# Patient Record
Sex: FEMALE | Race: WHITE | Employment: FULL TIME | URBAN - METROPOLITAN AREA
[De-identification: names, ages, dates, MRNs, and addresses within clinical notes are randomized per-mention and may not be internally consistent; named-entity substitution may affect disease eponyms.]

---

## 2018-12-12 ENCOUNTER — HOSPITAL ENCOUNTER (OUTPATIENT)
Dept: MAMMOGRAPHY | Age: 48
Discharge: HOME | End: 2018-12-12
Attending: NURSE PRACTITIONER
Payer: COMMERCIAL

## 2018-12-12 DIAGNOSIS — Z12.39 SCREENING BREAST EXAMINATION: ICD-10-CM

## 2018-12-12 PROCEDURE — 77067 SCR MAMMO BI INCL CAD: CPT

## 2018-12-26 ENCOUNTER — HOSPITAL ENCOUNTER (OUTPATIENT)
Dept: MAMMOGRAPHY | Age: 48
Discharge: HOME | End: 2018-12-26
Attending: NURSE PRACTITIONER
Payer: COMMERCIAL

## 2018-12-26 DIAGNOSIS — R92.8 ABNORMAL MAMMOGRAM: ICD-10-CM

## 2018-12-26 PROCEDURE — 77065 DX MAMMO INCL CAD UNI: CPT

## 2018-12-31 ENCOUNTER — HOSPITAL ENCOUNTER (OUTPATIENT)
Dept: MAMMOGRAPHY | Age: 48
Discharge: HOME | End: 2018-12-31
Attending: NURSE PRACTITIONER
Payer: COMMERCIAL

## 2018-12-31 DIAGNOSIS — R92.1 BREAST CALCIFICATIONS: ICD-10-CM

## 2018-12-31 PROCEDURE — 74011000250 HC RX REV CODE- 250: Performed by: RADIOLOGY

## 2018-12-31 PROCEDURE — 74011250636 HC RX REV CODE- 250/636: Performed by: RADIOLOGY

## 2018-12-31 PROCEDURE — 19081 BX BREAST 1ST LESION STRTCTC: CPT

## 2018-12-31 PROCEDURE — 77065 DX MAMMO INCL CAD UNI: CPT

## 2018-12-31 PROCEDURE — 88360 TUMOR IMMUNOHISTOCHEM/MANUAL: CPT

## 2018-12-31 PROCEDURE — 88305 TISSUE EXAM BY PATHOLOGIST: CPT

## 2018-12-31 PROCEDURE — 19082 BX BREAST ADD LESION STRTCTC: CPT

## 2018-12-31 RX ORDER — LIDOCAINE HYDROCHLORIDE AND EPINEPHRINE 10; 10 MG/ML; UG/ML
8 INJECTION, SOLUTION INFILTRATION; PERINEURAL ONCE
Status: COMPLETED | OUTPATIENT
Start: 2018-12-31 | End: 2018-12-31

## 2018-12-31 RX ORDER — LIDOCAINE HYDROCHLORIDE 10 MG/ML
12 INJECTION INFILTRATION; PERINEURAL
Status: COMPLETED | OUTPATIENT
Start: 2018-12-31 | End: 2018-12-31

## 2018-12-31 RX ORDER — SODIUM BICARBONATE 42 MG/ML
5 INJECTION, SOLUTION INTRAVENOUS
Status: COMPLETED | OUTPATIENT
Start: 2018-12-31 | End: 2018-12-31

## 2018-12-31 RX ADMIN — LIDOCAINE HYDROCHLORIDE AND EPINEPHRINE 80 MG: 10; 10 INJECTION, SOLUTION INFILTRATION; PERINEURAL at 10:20

## 2018-12-31 RX ADMIN — LIDOCAINE HYDROCHLORIDE 12 ML: 10 INJECTION, SOLUTION INFILTRATION; PERINEURAL at 10:20

## 2018-12-31 RX ADMIN — SODIUM BICARBONATE 210 MG: 42 SOLUTION INTRAVENOUS at 10:20

## 2019-01-09 PROBLEM — C50.912 BREAST CANCER, LEFT (HCC): Status: ACTIVE | Noted: 2019-01-09

## 2019-01-10 ENCOUNTER — OFFICE VISIT (OUTPATIENT)
Dept: SURGERY | Age: 49
End: 2019-01-10

## 2019-01-10 ENCOUNTER — TELEPHONE (OUTPATIENT)
Dept: SURGERY | Age: 49
End: 2019-01-10

## 2019-01-10 VITALS
HEART RATE: 88 BPM | WEIGHT: 249 LBS | HEIGHT: 72 IN | DIASTOLIC BLOOD PRESSURE: 94 MMHG | SYSTOLIC BLOOD PRESSURE: 151 MMHG | BODY MASS INDEX: 33.72 KG/M2

## 2019-01-10 DIAGNOSIS — Z80.3 FAMILY HISTORY OF BREAST CANCER: Primary | ICD-10-CM

## 2019-01-10 DIAGNOSIS — C50.412 MALIGNANT NEOPLASM OF UPPER-OUTER QUADRANT OF LEFT BREAST IN FEMALE, ESTROGEN RECEPTOR POSITIVE (HCC): ICD-10-CM

## 2019-01-10 DIAGNOSIS — Z17.0 MALIGNANT NEOPLASM OF UPPER-OUTER QUADRANT OF LEFT BREAST IN FEMALE, ESTROGEN RECEPTOR POSITIVE (HCC): Primary | ICD-10-CM

## 2019-01-10 DIAGNOSIS — C50.412 MALIGNANT NEOPLASM OF UPPER-OUTER QUADRANT OF LEFT BREAST IN FEMALE, ESTROGEN RECEPTOR POSITIVE (HCC): Primary | ICD-10-CM

## 2019-01-10 DIAGNOSIS — Z17.0 MALIGNANT NEOPLASM OF UPPER-OUTER QUADRANT OF LEFT BREAST IN FEMALE, ESTROGEN RECEPTOR POSITIVE (HCC): ICD-10-CM

## 2019-01-10 NOTE — PATIENT INSTRUCTIONS

## 2019-01-10 NOTE — PROGRESS NOTES
HISTORY OF PRESENT ILLNESS  Corin Rodriguez is a 50 y.o. female. HPI NEW patient referral for consultation by Sincere Alonso NP for an abnormal LEFT breast mammogram. Stereotactic biopsy X 2 revealed IDC, DCIS and ADH. Patient had not noticed any mass or skin change or nipple discharge before the biopsies. Now has some bruising LEFT breast.    Her sister is BRCA1 positive and had bilateral MAYELIN in 2003  FINAL PATHOLOGIC DIAGNOSIS   1. Breast, left, site #1, biopsy:   Invasive ductal carcinoma, favor histologic grade 2. Ductal carcinoma in situ. Microcalcifications associated with ductal carcinoma in situ. 2. Breast, left, site #2, biopsy:   Atypical ductal hyperplasia. Microcalcifications associated with benign duct profiles. \  1/2019 LEFT invasive ductal carcinoma, grade 2, %. [place] 70%, Her 2 -      OB History     Obstetric Comments    Menarche 15, LMP 1/1/2019, # of children 2, age of 4st delivery 32, Hysterectomy/oophorectomy no/no, Breast bx no, history of breast feeding yes, BCP yes, Hormone therapy no      Family History - Sister had breast cancer age 40. She is BRCA 1 positive as is her father. Her paternal grandmother had uterine cancer. Screening mammogram and LEFT diagnostic mammogram 12/31/2018  FINDINGS: Left digital diagnostic mammography was performed, and is interpreted  in conjunction with a computer assisted detection (CAD) system. Further  evaluation was performed for left breast calcifications. On the present exam  there are segmental fine pleomorphic calcifications in the left upper outer  quadrant centered at 1-2 o'clock anterior to middle depth. Calcifications span  up to 9 cm in anterior to posterior dimension, 9 cm in the transverse dimension,  and 11 cm craniocaudal.     IMPRESSION:     1. BI-RADS Assessment Category 4: Suspicious abnormality - Biopsy should be  performed in the absence of clinical contraindication.         Review of Systems   Constitutional: Negative. HENT: Negative. Eyes: Negative. Respiratory: Negative. Cardiovascular: Negative. Gastrointestinal: Negative. Genitourinary: Positive for urgency. Musculoskeletal: Negative. Skin: Negative. Neurological: Negative. Endo/Heme/Allergies: Negative. Psychiatric/Behavioral: The patient is nervous/anxious. Physical Exam   Constitutional: She is oriented to person, place, and time. She appears well-developed and well-nourished. Cardiovascular: Normal rate, regular rhythm and normal heart sounds. Pulmonary/Chest: Effort normal and breath sounds normal. Right breast exhibits no inverted nipple, no mass, no nipple discharge, no skin change and no tenderness. Left breast exhibits skin change (two biopsy scars and minimal ecchymosis UOQ). Left breast exhibits no inverted nipple, no mass, no nipple discharge and no tenderness. Breasts are symmetrical.       Lymphadenopathy:     She has no cervical adenopathy. She has no axillary adenopathy. Right: No supraclavicular adenopathy present. Left: No supraclavicular adenopathy present. Neurological: She is alert and oriented to person, place, and time. Skin: Skin is warm and dry. ASSESSMENT and PLAN    ICD-10-CM ICD-9-CM    1. Family history of breast cancer Z80.3 V16.3 BRAC-ANALYSIS   2. Malignant neoplasm of upper-outer quadrant of left breast in female, [facility] C50.412 174.4     Z17.0 V86.0        50 minutes were spent face-to-face with the patient and her sister during this encounter and >50% of that time was spent on counseling and coordination of care. 1. Discussed lumpectomy and radiation vs mastectomy. Discussed reconstruction. BRCA tested today. If she is BRCA positive she will have bilateral mastectomies. If she is BRCA negative, she may have a lumpectomy  2.  Discussed sentinel lymph node biopsy including intra-operative frozen section and possible lymph node dissection if the sentinel node is positive. 3. Discussed external beam radiation if she has a lumpectomy. 4. Discussed endocrine therapy which she should take after surgery. 5. It is unlikely that she will need chemotherapy. Plan: BRCA tested today  Plastic surgery appointment to discusse reconstruction options.

## 2019-01-11 ENCOUNTER — DOCUMENTATION ONLY (OUTPATIENT)
Dept: SURGERY | Age: 49
End: 2019-01-11

## 2019-01-17 NOTE — TELEPHONE ENCOUNTER
APPOINTMENT SCHEDULED FOR DR HUNT ON  1/25/19 AT 2:30 PM. PATIENT TO ARRIVE AT 2 PM  TO REGISTER  LOCATION IS [facility] #[id number]- [place]

## 2019-01-29 ENCOUNTER — TELEPHONE (OUTPATIENT)
Dept: SURGERY | Age: 49
End: 2019-01-29

## 2019-01-29 ENCOUNTER — DOCUMENTATION ONLY (OUTPATIENT)
Dept: SURGERY | Age: 49
End: 2019-01-29

## 2019-01-29 DIAGNOSIS — Z17.0 MALIGNANT NEOPLASM OF UPPER-OUTER QUADRANT OF LEFT BREAST IN FEMALE, ESTROGEN RECEPTOR POSITIVE (HCC): Primary | ICD-10-CM

## 2019-01-29 DIAGNOSIS — C50.412 MALIGNANT NEOPLASM OF UPPER-OUTER QUADRANT OF LEFT BREAST IN FEMALE, ESTROGEN RECEPTOR POSITIVE (HCC): Primary | ICD-10-CM

## 2019-01-29 NOTE — TELEPHONE ENCOUNTER
Called patient. She is BRCA positive  She will have bilateral mastectomies  Dr Joey Reed is not available for surgery until May.   Will do a lumpectomy and node biopsy first

## 2019-01-29 NOTE — PROGRESS NOTES
PATIENT SCHEDULED TO SEE DR. DONNELLY ON 2-27-19 AT 4:15 P.M.; PATIENT IS TO ARRIVE NO LATER THAN 3:45 PM FOR APPOINTMENT. PACKET WILL BE SENT TO PATIENT AND SHE WILL FILL OUT PAPERWORK AND BRING TO APPOINTMENT.     PATIENT WAS CALLED AND GIVEN INSTRUCTIONS

## 2019-01-29 NOTE — TELEPHONE ENCOUNTER
SCHEDULED SURGERY AT [facility] ON 2-20-19 AT 7:30 AM; PATIENT WILL ARRIVE AT 6:00 AM AND CHECK IN ON 2ND FL AT PATIENT REGISTRATION    POST OP AT [facility] ON 2-28-19 AT 1:30 PM    PATIENT HAS BEEN CALLED AND GIVEN INSTRUCTIONS

## 2019-02-01 DIAGNOSIS — Z17.0 MALIGNANT NEOPLASM OF UPPER-OUTER QUADRANT OF LEFT BREAST IN FEMALE, ESTROGEN RECEPTOR POSITIVE (HCC): Primary | ICD-10-CM

## 2019-02-01 DIAGNOSIS — C50.412 MALIGNANT NEOPLASM OF UPPER-OUTER QUADRANT OF LEFT BREAST IN FEMALE, ESTROGEN RECEPTOR POSITIVE (HCC): Primary | ICD-10-CM

## 2019-02-01 NOTE — TELEPHONE ENCOUNTER
NEEDLE LOCALIZATION WAS ADDED TO SURGERY ON 2-20-19 AT 8:00 A. M. WITH SURGERY STARTING AT 10:30 A. M.; PATIENT WILL STILL ARRIVE AT 6:00 AM TO CHECK IN ON THE 1ST FLOOR. LEFT DETAILED MESSAGE ON PATIENT'S CELL PHONE TO CHECK IN ON THE 1ST FLOOR NOW INSTEAD OF 2ND FL BUT TO STILL ARRIVE AT 6 AM.  WILL CALL PATIENT AGAIN.

## 2019-02-05 ENCOUNTER — DOCUMENTATION ONLY (OUTPATIENT)
Dept: SURGERY | Age: 49
End: 2019-02-05

## 2019-02-05 NOTE — PROGRESS NOTES
PATIENT CONFIRMED TODAY THAT SHE WILL ARRIVE TO [facility] ON 2-20-19 AT 6 AM CHECKING IN ON 1ST FLOOR REGISTRATION

## 2019-02-06 RX ORDER — BISMUTH SUBSALICYLATE 262 MG
1 TABLET,CHEWABLE ORAL DAILY
COMMUNITY
End: 2019-06-10

## 2019-02-06 RX ORDER — ERGOCALCIFEROL 1.25 MG/1
50000 CAPSULE ORAL DAILY
COMMUNITY
End: 2019-03-04

## 2019-02-06 NOTE — PERIOP NOTES
[address] [address] [facility]                            MAIN OR                                  [phone]   MAIN PRE OP                          [phone]                                                                                AMBULATORY PRE OP          [phone]  PRE-ADMISSION TESTING    [phone]     Surgery Date:   2/20/19         Is surgery arrival time given by surgeon? YES  If NO, [facility] staff will call you between 3 and 7pm the day before your surgery with your arrival time. (If your surgery is on a Monday, we will call you the Friday before.)    Call [phone] after 7pm Monday-Friday if you did not receive your arrival time. INSTRUCTIONS BEFORE YOUR SURGERY   When You  Arrive   Arrive at the 2nd [address] [facility] on the day of your surgery  Have your insurance card, photo ID, and any copayment (if needed)     Food   and   Drink   NO food or drink after midnight the night before surgery    This means NO water, gum, mints, coffee, juice, etc.  No alcohol (beer, wine, liquor) 24 hours before and after surgery     Medications to   TAKE   Morning of Surgery   MEDICATIONS TO TAKE THE MORNING OF SURGERY WITH A SIP OF WATER:    NONE     Medications  To  STOP      7 days before surgery    Non-Steroidal anti-inflammatory Drugs (NSAID's): for example, Ibuprofen (Advil, Motrin), Naproxen (Aleve)   Aspirin, if taking for pain    Herbal supplements, vitamins, and fish oil   Other:  (Pain medications not listed above, including Tylenol may be taken)   Blood  Thinners    If you take  Aspirin, Plavix, Coumadin, or any blood-thinning or anti-blood clot medicine, talk to the doctor who prescribed the medications for pre-operative instructions.      Bathing Clothing  Jewelry  Valuables       If you shower the morning of surgery, please do not apply anything to your skin (lotions, powders, deodorant, or makeup, especially joann)   Follow all special bath instructions (for total joint replacement, spine and bowel surgeries)   Do not shave or trim anywhere 24 hours before surgery   Wear your hair loose or down; no pony-tails, buns, or metal hair clips   Wear loose, comfortable, clean clothes   Wear glasses instead of contacts   Leave money, valuables, and jewelry, including body piercings, at home     Going Home       or Spending the Night    SAME-DAY SURGERY: You must have a responsible adult drive you home and stay with you 24 hours after surgery   ADMITS: If your doctor is keeping you into the hospital after surgery, leave personal belongings/luggage in your car until you have a hospital room number. Hospital discharge time is 12 noon  Drivers must be here before 12 noon unless you are told differently   Special Instructions          Follow all instructions so your surgery wont be cancelled. Please, be on time. If a situation occurs and you are delayed the day of surgery, call [phone] or          [phone]. If your physical condition changes (like a fever, cold, flu, etc.) call your surgeon. The patient was contacted  via phone. Home medication reviewed and verified during PAT appointment. The patient verbalizes understanding of all instructions and does not  need reinforcement.

## 2019-02-19 ENCOUNTER — ANESTHESIA EVENT (OUTPATIENT)
Dept: SURGERY | Age: 49
End: 2019-02-19
Payer: COMMERCIAL

## 2019-02-19 ENCOUNTER — TELEPHONE (OUTPATIENT)
Dept: SURGERY | Age: 49
End: 2019-02-19

## 2019-02-19 NOTE — PERIOP NOTES
Received phone call from panda Owens requesting clarification of her time for needle localization and actual OR time    Per chart:  pt was told to be at 1st floor registration for her mammography appointment (needle loc & wire placement by 7:15 on 2/20/19 for her first procedure and that mammography staff  would  Transport her up to surgical suite immediately after the first procedure.   Her actual OR time is listed as 10:30

## 2019-02-19 NOTE — TELEPHONE ENCOUNTER
Received message from Segundo Moseley at [facility], inquiring about how many sites would have to be needle loc prior to surgery on 2/20/19. Spoke with Dr. Viktoriya Ayers and she stated patient would need bracketing wires for needle loc. I called back and spoke with Lisbeth to relay this information.

## 2019-02-20 ENCOUNTER — APPOINTMENT (OUTPATIENT)
Dept: MAMMOGRAPHY | Age: 49
End: 2019-02-20
Attending: SURGERY
Payer: COMMERCIAL

## 2019-02-20 ENCOUNTER — HOSPITAL ENCOUNTER (OUTPATIENT)
Age: 49
Setting detail: OUTPATIENT SURGERY
Discharge: HOME | End: 2019-02-20
Attending: SURGERY | Admitting: SURGERY
Payer: COMMERCIAL

## 2019-02-20 ENCOUNTER — HOSPITAL ENCOUNTER (OUTPATIENT)
Dept: GENERAL RADIOLOGY | Age: 49
Setting detail: OUTPATIENT SURGERY
Discharge: HOME | End: 2019-02-20
Attending: SURGERY | Admitting: SURGERY
Payer: COMMERCIAL

## 2019-02-20 ENCOUNTER — ANESTHESIA (OUTPATIENT)
Dept: SURGERY | Age: 49
End: 2019-02-20
Payer: COMMERCIAL

## 2019-02-20 VITALS
TEMPERATURE: 98 F | RESPIRATION RATE: 15 BRPM | DIASTOLIC BLOOD PRESSURE: 88 MMHG | WEIGHT: 221 LBS | BODY MASS INDEX: 29.93 KG/M2 | HEIGHT: 72 IN | SYSTOLIC BLOOD PRESSURE: 142 MMHG | HEART RATE: 84 BPM | OXYGEN SATURATION: 99 %

## 2019-02-20 DIAGNOSIS — Z85.3 HX OF BREAST CANCER: ICD-10-CM

## 2019-02-20 DIAGNOSIS — Z17.0 MALIGNANT NEOPLASM OF UPPER-OUTER QUADRANT OF LEFT BREAST IN FEMALE, ESTROGEN RECEPTOR POSITIVE (HCC): ICD-10-CM

## 2019-02-20 DIAGNOSIS — Z98.890 HISTORY OF BREAST SURGERY: Primary | ICD-10-CM

## 2019-02-20 DIAGNOSIS — C50.412 MALIGNANT NEOPLASM OF UPPER-OUTER QUADRANT OF LEFT FEMALE BREAST (HCC): ICD-10-CM

## 2019-02-20 DIAGNOSIS — C50.412 MALIGNANT NEOPLASM OF UPPER-OUTER QUADRANT OF LEFT BREAST IN FEMALE, ESTROGEN RECEPTOR POSITIVE (HCC): ICD-10-CM

## 2019-02-20 LAB — HCG UR QL: NEGATIVE

## 2019-02-20 PROCEDURE — 77030020782 HC GWN BAIR PAWS FLX 3M -B

## 2019-02-20 PROCEDURE — 77030002996 HC SUT SLK J&J -A: Performed by: SURGERY

## 2019-02-20 PROCEDURE — 74011250636 HC RX REV CODE- 250/636

## 2019-02-20 PROCEDURE — 77030031139 HC SUT VCRL2 J&J -A: Performed by: SURGERY

## 2019-02-20 PROCEDURE — 81025 URINE PREGNANCY TEST: CPT

## 2019-02-20 PROCEDURE — 88342 IMHCHEM/IMCYTCHM 1ST ANTB: CPT

## 2019-02-20 PROCEDURE — 77030002933 HC SUT MCRYL J&J -A: Performed by: SURGERY

## 2019-02-20 PROCEDURE — 19282 PERQ DEVICE BREAST EA IMAG: CPT

## 2019-02-20 PROCEDURE — 19281 PERQ DEVICE BREAST 1ST IMAG: CPT

## 2019-02-20 PROCEDURE — 77030018836 HC SOL IRR NACL ICUM -A: Performed by: SURGERY

## 2019-02-20 PROCEDURE — 77030039266 HC ADH SKN EXOFIN S2SG -A: Performed by: SURGERY

## 2019-02-20 PROCEDURE — 74011250636 HC RX REV CODE- 250/636: Performed by: SURGERY

## 2019-02-20 PROCEDURE — 77030020143 HC AIRWY LARYN INTUB CGAS -A: Performed by: NURSE ANESTHETIST, CERTIFIED REGISTERED

## 2019-02-20 PROCEDURE — 76210000050 HC AMBSU PH II REC 0.5 TO 1 HR: Performed by: SURGERY

## 2019-02-20 PROCEDURE — 74011000250 HC RX REV CODE- 250: Performed by: SURGERY

## 2019-02-20 PROCEDURE — 88341 IMHCHEM/IMCYTCHM EA ADD ANTB: CPT

## 2019-02-20 PROCEDURE — 77030032490 HC SLV COMPR SCD KNE COVD -B: Performed by: SURGERY

## 2019-02-20 PROCEDURE — 76030000001 HC AMB SURG OR TIME 1 TO 1.5: Performed by: SURGERY

## 2019-02-20 PROCEDURE — 76060000062 HC AMB SURG ANES 1 TO 1.5 HR: Performed by: SURGERY

## 2019-02-20 PROCEDURE — 74011250636 HC RX REV CODE- 250/636: Performed by: ANESTHESIOLOGY

## 2019-02-20 PROCEDURE — 88307 TISSUE EXAM BY PATHOLOGIST: CPT

## 2019-02-20 PROCEDURE — 76210000040 HC AMBSU PH I REC FIRST 0.5 HR: Performed by: SURGERY

## 2019-02-20 RX ORDER — BUPIVACAINE HYDROCHLORIDE AND EPINEPHRINE 5; 5 MG/ML; UG/ML
30 INJECTION, SOLUTION EPIDURAL; INTRACAUDAL; PERINEURAL
Status: COMPLETED | OUTPATIENT
Start: 2019-02-20 | End: 2019-02-20

## 2019-02-20 RX ORDER — LIDOCAINE HYDROCHLORIDE 10 MG/ML
4 INJECTION, SOLUTION EPIDURAL; INFILTRATION; INTRACAUDAL; PERINEURAL
Status: COMPLETED | OUTPATIENT
Start: 2019-02-20 | End: 2019-02-20

## 2019-02-20 RX ORDER — LIDOCAINE HYDROCHLORIDE 10 MG/ML
0.1 INJECTION, SOLUTION EPIDURAL; INFILTRATION; INTRACAUDAL; PERINEURAL AS NEEDED
Status: DISCONTINUED | OUTPATIENT
Start: 2019-02-20 | End: 2019-02-20 | Stop reason: HOSPADM

## 2019-02-20 RX ORDER — HYDROMORPHONE HYDROCHLORIDE 1 MG/ML
.25-1 INJECTION, SOLUTION INTRAMUSCULAR; INTRAVENOUS; SUBCUTANEOUS
Status: DISCONTINUED | OUTPATIENT
Start: 2019-02-20 | End: 2019-02-20 | Stop reason: HOSPADM

## 2019-02-20 RX ORDER — FENTANYL CITRATE 50 UG/ML
INJECTION, SOLUTION INTRAMUSCULAR; INTRAVENOUS AS NEEDED
Status: DISCONTINUED | OUTPATIENT
Start: 2019-02-20 | End: 2019-02-20 | Stop reason: HOSPADM

## 2019-02-20 RX ORDER — MIDAZOLAM HYDROCHLORIDE 1 MG/ML
INJECTION, SOLUTION INTRAMUSCULAR; INTRAVENOUS AS NEEDED
Status: DISCONTINUED | OUTPATIENT
Start: 2019-02-20 | End: 2019-02-20 | Stop reason: HOSPADM

## 2019-02-20 RX ORDER — METHYLENE BLUE 10 MG/ML
2.5 INJECTION INTRAVENOUS ONCE
Status: COMPLETED | OUTPATIENT
Start: 2019-02-20 | End: 2019-02-20

## 2019-02-20 RX ORDER — FLUMAZENIL 0.1 MG/ML
0.2 INJECTION INTRAVENOUS
Status: DISCONTINUED | OUTPATIENT
Start: 2019-02-20 | End: 2019-02-20 | Stop reason: HOSPADM

## 2019-02-20 RX ORDER — LIDOCAINE HYDROCHLORIDE 10 MG/ML
INJECTION, SOLUTION EPIDURAL; INFILTRATION; INTRACAUDAL; PERINEURAL
Status: COMPLETED
Start: 2019-02-20 | End: 2019-02-20

## 2019-02-20 RX ORDER — DEXAMETHASONE SODIUM PHOSPHATE 4 MG/ML
INJECTION, SOLUTION INTRA-ARTICULAR; INTRALESIONAL; INTRAMUSCULAR; INTRAVENOUS; SOFT TISSUE AS NEEDED
Status: DISCONTINUED | OUTPATIENT
Start: 2019-02-20 | End: 2019-02-20 | Stop reason: HOSPADM

## 2019-02-20 RX ORDER — NALOXONE HYDROCHLORIDE 0.4 MG/ML
0.2 INJECTION, SOLUTION INTRAMUSCULAR; INTRAVENOUS; SUBCUTANEOUS
Status: DISCONTINUED | OUTPATIENT
Start: 2019-02-20 | End: 2019-02-20 | Stop reason: HOSPADM

## 2019-02-20 RX ORDER — MIDAZOLAM HYDROCHLORIDE 1 MG/ML
2 INJECTION, SOLUTION INTRAMUSCULAR; INTRAVENOUS
Status: DISCONTINUED | OUTPATIENT
Start: 2019-02-20 | End: 2019-02-20 | Stop reason: HOSPADM

## 2019-02-20 RX ORDER — DIPHENHYDRAMINE HYDROCHLORIDE 50 MG/ML
12.5 INJECTION, SOLUTION INTRAMUSCULAR; INTRAVENOUS AS NEEDED
Status: DISCONTINUED | OUTPATIENT
Start: 2019-02-20 | End: 2019-02-20 | Stop reason: HOSPADM

## 2019-02-20 RX ORDER — HYDROCODONE BITARTRATE AND ACETAMINOPHEN 5; 325 MG/1; MG/1
1 TABLET ORAL
Qty: 10 TAB | Refills: 0 | Status: SHIPPED | OUTPATIENT
Start: 2019-02-20 | End: 2019-03-04

## 2019-02-20 RX ORDER — ONDANSETRON 2 MG/ML
INJECTION INTRAMUSCULAR; INTRAVENOUS AS NEEDED
Status: DISCONTINUED | OUTPATIENT
Start: 2019-02-20 | End: 2019-02-20 | Stop reason: HOSPADM

## 2019-02-20 RX ORDER — SODIUM CHLORIDE, SODIUM LACTATE, POTASSIUM CHLORIDE, CALCIUM CHLORIDE 600; 310; 30; 20 MG/100ML; MG/100ML; MG/100ML; MG/100ML
125 INJECTION, SOLUTION INTRAVENOUS CONTINUOUS
Status: DISCONTINUED | OUTPATIENT
Start: 2019-02-20 | End: 2019-02-20 | Stop reason: HOSPADM

## 2019-02-20 RX ORDER — PROPOFOL 10 MG/ML
INJECTION, EMULSION INTRAVENOUS AS NEEDED
Status: DISCONTINUED | OUTPATIENT
Start: 2019-02-20 | End: 2019-02-20 | Stop reason: HOSPADM

## 2019-02-20 RX ADMIN — FENTANYL CITRATE 25 MCG: 50 INJECTION, SOLUTION INTRAMUSCULAR; INTRAVENOUS at 10:56

## 2019-02-20 RX ADMIN — PROPOFOL 200 MG: 10 INJECTION, EMULSION INTRAVENOUS at 10:54

## 2019-02-20 RX ADMIN — HYDROMORPHONE HYDROCHLORIDE 0.5 MG: 1 INJECTION, SOLUTION INTRAMUSCULAR; INTRAVENOUS; SUBCUTANEOUS at 12:35

## 2019-02-20 RX ADMIN — FENTANYL CITRATE 25 MCG: 50 INJECTION, SOLUTION INTRAMUSCULAR; INTRAVENOUS at 12:01

## 2019-02-20 RX ADMIN — MIDAZOLAM HYDROCHLORIDE 2 MG: 1 INJECTION, SOLUTION INTRAMUSCULAR; INTRAVENOUS at 10:47

## 2019-02-20 RX ADMIN — FENTANYL CITRATE 25 MCG: 50 INJECTION, SOLUTION INTRAMUSCULAR; INTRAVENOUS at 11:06

## 2019-02-20 RX ADMIN — FENTANYL CITRATE 25 MCG: 50 INJECTION, SOLUTION INTRAMUSCULAR; INTRAVENOUS at 10:57

## 2019-02-20 RX ADMIN — DEXAMETHASONE SODIUM PHOSPHATE 8 MG: 4 INJECTION, SOLUTION INTRA-ARTICULAR; INTRALESIONAL; INTRAMUSCULAR; INTRAVENOUS; SOFT TISSUE at 11:04

## 2019-02-20 RX ADMIN — SODIUM CHLORIDE, SODIUM LACTATE, POTASSIUM CHLORIDE, AND CALCIUM CHLORIDE 125 ML/HR: 600; 310; 30; 20 INJECTION, SOLUTION INTRAVENOUS at 10:15

## 2019-02-20 RX ADMIN — FENTANYL CITRATE 25 MCG: 50 INJECTION, SOLUTION INTRAMUSCULAR; INTRAVENOUS at 10:47

## 2019-02-20 RX ADMIN — LIDOCAINE HYDROCHLORIDE 3 ML: 10 INJECTION, SOLUTION EPIDURAL; INFILTRATION; INTRACAUDAL; PERINEURAL at 08:25

## 2019-02-20 RX ADMIN — FENTANYL CITRATE 25 MCG: 50 INJECTION, SOLUTION INTRAMUSCULAR; INTRAVENOUS at 11:32

## 2019-02-20 RX ADMIN — ONDANSETRON 4 MG: 2 INJECTION INTRAMUSCULAR; INTRAVENOUS at 12:08

## 2019-02-20 RX ADMIN — HYDROMORPHONE HYDROCHLORIDE 0.5 MG: 1 INJECTION, SOLUTION INTRAMUSCULAR; INTRAVENOUS; SUBCUTANEOUS at 12:25

## 2019-02-20 RX ADMIN — FENTANYL CITRATE 25 MCG: 50 INJECTION, SOLUTION INTRAMUSCULAR; INTRAVENOUS at 10:58

## 2019-02-20 RX ADMIN — FENTANYL CITRATE 25 MCG: 50 INJECTION, SOLUTION INTRAMUSCULAR; INTRAVENOUS at 11:18

## 2019-02-20 NOTE — OP NOTES
Re[facility] [address]  [address] [facility] [address]    Name: Suzy Kan  : 1970    Left Breast Lumpectomy with [place] Node Biopsy   Operative Report      Date of Surgery: 2019  Preoperative Diagnosis:  Left breast cancer, UOQ  Postoperative Diagnosis: same   Surgeon: Dr Jack Dang  Anesthesia: General  Procedure: Left breast lumpectomy with sentinel lymph node biopsy  Indication: LEFT breast cancer           Procedure Note:  Suzy Kan was brought into the operating room and placed supine on the table. She was induced with general anesthesia. 5cc of 1/2 strength methylene blue dye was injected in the LEFT subareolar space and the breast was massage for 5 minutes. The LEFT breast and axilla were then prepped and draped in the usual sterile fashion. 0.5% marcaine was used for local anesthesia. A 3cm incision was made in the lower axilla and the axilla was accessed with blunt dissection. 1 blue and 2 non-blue lymph nodes were removed and sent to pathology. No other enlarged lymph nodes were palpated. The breast tumor was in the [address] [place] [address] Road. Three guide wires were placed preoperatively to wilfrid 2 biopsy clips and clustered microcalcifications. A 6 cm oblique incision was made in the UOQ 3 cm from the nipple, between the wires. Wide excision of the tissue and wires was performed using sharp dissection and electrocautery. The specimen was marked with sutures for orientation purposes, x-rayed to confirm that the clips and wires had been removed, and sent to pathology. Hemostasis was controlled with electrocautery. Both incisions were closed in 2 layers with 3-0 vicryl for the subcutaneous tissue and 4-0 Monocryl for the skin. The wounds were dressed with skin glue and the patient was awakened and extubated and taken to the recovery room. Sponge, needle and instrument counts were reported be correct.      Findings:  3 sentinel nodes (1 blue)  Estimated Blood Loss:  20 cc       Specimens:   ID Type Source Tests Collected by Time Destination   1 : left axillary sentinel nodes (3) Preservative Axilla  Miranda Castro MD 2/20/2019 1120 Pathology   2 : left breast lumpectomy; 2 clips, 3 wires Preservative Breast  Miranda Castro MD 2/20/2019 1143 Pathology      Complications: none  Implants: none  Assistant: Emanuel Grijalva SA    Signed:  Kellie Busch MD

## 2019-02-20 NOTE — PERIOP NOTES
PACU IN REPORT FROM ANESTHESIA    Verbal report received from   [facility]   following General anesthesia  for Procedure(s) (LRB):  LEFT BREAST LUMPECTOMY WITH NEEDLE LOCALIZATION, LEFT BREAST SENTINEL NODE BIOPSY WITH BLUE DYE (Left) by  Janette Gonzalez MD.    Note the anesthesia record for medications given intraoperatively. Brief Initial Visual Assessment:    Patient Age: [] Infant(1-12mo)      []Pediatric(1-13yrs)    [] Adolescent(13-18yrs)    [x] Adult(18-65yrs)      []Geriatric Adult(>65yrs). Patient    [] Alert           [x]Calm & Cooperative      [] Anxious  Appearance: [x] Drowsy      [] Sedated                          [] Unresponsive     Oriented x   3             Airway:     [x] Patent                          [] Near-obstructed   [] Obstructed                        []Improved with head/airway repositioning                       Airway Adjuncts Present: [] Oral Airway    [] Nasal Trumpet         [] ETT     Respiratory  [x] Even              [] Labored      [] Shallow  Pattern:    [x] Non-Labored  [] VENT and/or respiratory assistance     being provided. Skin:     [x] Pink [] Dusky    [] Pale        [x] Warm    [] Hot [] Cool        [] Cold   [x]Dry [] Moist [] Diaphoretic     Membranes:  [x] Pink [] Pale       [x] Moist [] Dry [] Crusty     Pain:   [x] No Acute Discomfort. 3  /10 Scale [x] Verbal Numeric   [] Moderate Discomfort.      [] V.A.S. [] Acute Discomfort.                [] A.N.V.    [] Chronic-Issue Related Discomfort.   [] F.L.A.C.C. Note E-MAR for medications administered. []Faces, Tucker/Baker    Note assessments documented in flowsheets; any assessment variants to be found in comments or narrative perioperative nurse notes.        Post-anesthesia care now assumed by Elo MENDOSAN, RN-BC

## 2019-02-20 NOTE — PROGRESS NOTES
POST ANESTHESIA CARE    DISCHARGE / TRANSFER NOTE    Corin Rodriguez was   discharged     via      wheel chair     to  private vehicle    . Patient was escorted by    nurse   . Patient verbalized   appreciation and was very pleased with care received   throughout their stay. Patient was discharged in     pleasant mood  . Pain at discharge/transfer was       3-4   /10. Discharge, medication and follow-up instructions were verbalized as understood prior to discharge  (if applicable for same-day procedures being discharged.)    All personal belongings have been returned to patient, and patient/family verbally confirm receiving belongings as all present.     Signed: Xavier MENDOSAN RN-BC

## 2019-02-20 NOTE — H&P
History and Physical    Surgery History and Physical          Funmilayo Ibarra is a 50 y.o. female who presents for LEFT lumpectomy and sentinel node biopsy. 1/2019 LEFT invasive ductal carcinoma, grade 2, %. [place] 70%, Her 2 -  BRCA 1 positive    Review of Systems   Constitutional: Negative. HENT: Negative. Eyes: Negative. Respiratory: Negative. Cardiovascular: Negative. Gastrointestinal: Negative. Genitourinary: Positive for urgency. Musculoskeletal: Negative. Skin: Negative. Neurological: Negative. Endo/Heme/Allergies: Negative. Psychiatric/Behavioral: The patient is nervous/anxious.          Physical Exam   Constitutional: She is oriented to person, place, and time. She appears well-developed and well-nourished. Cardiovascular: Normal rate, regular rhythm and normal heart sounds. Pulmonary/Chest: Effort normal and breath sounds normal. Right breast exhibits no inverted nipple, no mass, no nipple discharge, no skin change and no tenderness. Left breast exhibits skin change (two biopsy scars and minimal ecchymosis UOQ). Left breast exhibits no inverted nipple, no mass, no nipple discharge and no tenderness. Breasts are symmetrical.       Lymphadenopathy:     She has no cervical adenopathy. She has no axillary adenopathy. Right: No supraclavicular adenopathy present. Left: No supraclavicular adenopathy present. Neurological: She is alert and oriented to person, place, and time. Skin: Skin is warm and dry.      Past Medical History:   Diagnosis Date    Rosacea      Past Surgical History:   Procedure Laterality Date    HX WISDOM TEETH EXTRACTION  1990      Family History   Problem Relation Age of Onset    Breast Cancer Sister         mid 35s     Social History     Tobacco Use    Smoking status: Never Smoker    Smokeless tobacco: Never Used   Substance Use Topics    Alcohol use: Yes     Drinks per session: 1 or 2      Prior to Admission medications Medication Sig Start Date End Date Taking? Authorizing Provider   multivitamin (ONE A DAY) tablet Take 1 Tab by mouth daily. Yes Provider, Historical   ergocalciferol (VITAMIN D2) 50,000 unit capsule Take 50,000 Units by mouth daily. Yes Provider, Historical   multivit-min/folic acid/biotin (HAIR,SKIN AND NAILS,FA-BIOTIN, PO) Take 1 Tab by mouth daily. Yes Provider, Historical   DOXYCYCLINE HYCLATE PO Take 10 mg by mouth daily. Provider, Historical      Allergies   Allergen Reactions    Penicillins Hives     Pt reports \"I reacted as a child with hives and was told never to take it again\"         IMPRESSION:LEFT breast cancer  PLAN: LEFT lumpectomy, needle localized, and sentinel node biopsy  Bilateral mastectomy and MAYELIN in a few months.   Signed By: Sharon Montano MD     February 20, 2019

## 2019-02-20 NOTE — DISCHARGE INSTRUCTIONS
Discharge Instructions from Dr. Ramsey Samples    Patient Discharge Instructions    Funmilayo Ibarra / [id number] : 1970    Admitted 2019 Discharged: 2019   What to do at Home  Diet:Regular  Activity: As tolerated. No driving if taking pain medication. Okay to shower or take a bath. You may chose to wear a bra to sleep in for extra support for the next few days. Pain control: Ice pack 20 minutes of every hour until you go to bed tonight. You may use over-the-counter medication as needed (acetominophen, aspirin, ibuprofen). Fill the prescription for hydrocodone if needed. Tomorrow you may put a heat pack on the breast.  Dressing: The skin glue is waterproof. It is okay to wash normally at this site. If the glue is still present after 10 days you should peel it off. Follow up: , 1:30pm [facility] at [facility]. Problems/Questions: Call Elva Mosquera MD on my cell phone. [phone]            DISCHARGE SUMMARY from Your Nurse    PATIENT INSTRUCTIONS:    AFTER ANESTHESIA & SEDATION, and WHILE TAKING PAIN MEDICINE  After general anesthesia / intravenous sedation and the 24 hours following, and/or while taking prescription Opiates:  · Limit your activities  · Do not drive and operate hazardous machinery until you have been of all narcotics and sedatives for over 24 hours  · Do not make important personal or business decisions  · Do  not drink alcoholic beverages  · If you have not urinated within 8 hours after discharge, please contact your surgeon on call.       SIGNS OF INFECTION  Report the following Signs of Infection or General Problems after surgery to your surgeon:  · Excessive pain, swelling, redness or odor of or around the surgical area  · Temperature over 101; Temperature over 100 if on medications that affect your ability to fight infections  · Nausea and vomiting lasting longer than 4 hours or if unable to take medications  · Any signs of decreased circulation or nerve impairment to extremity: change in color, persistent  numbness, tingling, coldness or increase pain  · If you have any questions. COUGH AND DEEP BREATHE  Breathing deep and coughing are very important exercises to do after surgery. Deep breathing and coughing open the little air tubes and air sacks in your lungs. You take deep breaths every day. You may not even notice - it is just something you do when you sigh or yawn. It is a natural exercise you do to keep these air passages open. After surgery, take deep breaths and cough, on purpose. Coughing and deep breathing help prevent bronchitis and pneumonia after surgery. If you had chest or belly surgery, use a pillow as a \"hug elian\" and hold it tightly to your chest or belly when you cough. DIRECTIONS:  1. Take 10 to 15 slow deep breaths every hour while awake. 2. Breathe in deeply, and hold it for 2 seconds. 3. Exhale slowly through puckered lips, like blowing up a balloon. 4. After every 4th or 5th deep breath, hug your pillow to your chest or belly and give a hard, deep cough. Yes, it will probably hurt. But doing this exercise is very important part of healing after surgery. Take your pain medicine to help you do this exercise without too much pain. ANKLE PUMPS    Ankle pumps increase the circulation of oxygenated blood to your lower extremities and decrease your risk for circulation problems such as blood clots. They also stretch the muscles, tendons and ligaments in your foot and ankle, and prevent joint contracture in the ankle and foot, especially after surgeries on the legs. It is important to do ankle pump exercises regularly after surgery because immobility increases your risk for developing a blood clot. Your doctor may also have you take an Aspirin for the next few days as well.       If your doctor did not ask you to take an Aspirin, consult with him before starting Aspirin therapy on your own. The exercise is quite simple. · Slowly point your foot forward, feeling the muscles on the top of your lower leg stretch, and hold this position for 5 seconds. · Next, pull your foot back toward you as far as possible, stretching the calf muscles, and hold that position for 5 seconds. · Repeat with the other foot. · Perform 10 repetitions every hour while awake for both ankles if possible (down and then up with the foot once is one repetition). You should feel gentle stretching of the muscles in your lower leg when doing this exercise. If you feel pain, or your range of motion is limited, don't push too hard. Only go the limit your joint and muscles will let you go. If you have increasing pain, progressively worsening leg warmth or swelling, STOP the exercise and call your doctor. Other Wound Care information:  [x] No additional recommendations. Below is information on the medication(s) your doctor is prescribing for you: The maximum daily dose of acetaminophen was discussed with the patient. She was encouraged not to exceed 3,000 mg of acetaminophen during a 24 hour period and was asked to keep in mind that acetaminophen can also be found in many over-the-counter cold medications as well as narcotics that may be given for pain. The patient expresses understanding of these issues and questions were answered. 4 THINGS ABOUT PAIN MEDICINE I ALWAYS TALK ABOUT:  There are 4 side effects I always talk about for pain medications. 1. They make you sleepy and drowsy. Do not drive a car or operate machines while taking pain medication. Do not make any major decisions. Take a nap. Relax. Let your body recover from the affects of anesthesia and surgery. 2. Some people have quite a problem with itching and. ..  3. Nausea or vomiting.   I mention these together because research tends to suggest there is a gene-related issue. While some have a hard time with these problems, others do not. These are expected and know side effects. Over-the-counter Benadryl® (the drug name is diphenhydramine) can help with the itching. Your doctor may also have given you some medicine for nausea. IF HE DID NOT, CALL HIM/HER. 4. Last but not least is the problem of constipation (not camelia able to have a bowel movement - poop.)  All pain medicine can slow down the movement of food through the gut. The slower it goes, the worse it can be. Frankly, this means hard poop adding insult to the injury of surgery. And if you had tummy surgery, like having your gall bladder removed or a hernia repair, YOU DO NOT WANT THIS PROBLEM. There are 4 things I recommend. · Drink lots of fluids. For healthy people with no heart problems, this means at least 64 ounces of liquids or more per day. For example, a Big Gulp® from [employer] is 32 ounces. So you need to drink at least 2  Big Gulp®'s of fluids every day. If you have heart problems you may not be able to do this. Talk to your doctor about what you should do to prevent constipation. · Drinking fruit juice like apple, pear, or prune juice gives you extra \"BANG\" for your beverage. These drinks are high in natural fiber. If you are a diabetic, drink sugar-free fluids with fiber additives (see next 2 points.)  Avoid drinking extra fruit juice unless this is a regular part of your diet plan. · Eat extra fresh fruits and vegetables. · Add extra fiber-products. Fiber products like Metamucil®, Citrucel®, Miralax® or Benefiber® can help. These products are over-the-counter and you do not need a prescription from your doctor. If you have followed these recommendations and still have some difficulty having a good poop, take and over-the-counter stimulant like Dulcolax® (biscodyl)  or Senokot® (senna concentrate). These may help get things moving.       [employer] MEDICATION AND SIDE EFFECT GUIDE    The [facility] MEDICATION AND SIDE EFFECT GUIDE was provided to the PATIENT AND CARE PROVIDER. Information provided includes instruction about drug purpose and common side effects for the following medications:   · [address] - for pain        Medication information added to discharge record on February 20, 2019 at 12:38 PM.      Some information we wish all of our patients to be familiar with and General Information for Healthy Lifestyle choices:    · Make a list of your current medications with your Primary Care Provider. · Update this list whenever your medications are discontinued, doses are changed, or new medications (including over-the-counter products like ibuprofen, vitamins, or herbal remedies) are added. · Carry medication information at all times in case of emergency situations      No smoking / No tobacco products / Avoid exposure to second hand smoke    Surgeon General's Warning:  Quitting smoking now greatly reduces serious risk to your health. Obesity, smoking, and sedentary lifestyle greatly increases your risk for illness. A healthy diet, regular physical exercise & weight monitoring are important for maintaining a healthy lifestyle. A Note About Congestive Heart Failure: You may be retaining fluid if you have a history of heart failure or if you experience any of the following symptoms:      · Weight gain of 3 pounds or more overnight or 5 pounds in a week  · Increased swelling in our hands or feet  · Shortness of breath while lying flat in bed      Please call your doctor as soon as you notice any of these symptoms; do not wait until your next office visit. A Note About Strokes:  Recognize signs and symptoms of STROKE.   The simple mnemonic, F.A.S.T., can help you remember signs of a stroke and what to do if you suspect a stroke is occuring to you or someone you are with:    F - Face looks uneven  A - Arms unable to move, or move evenly  S - Speech is slurred or non-existent  T - Time - CALL 911 as soon as signs and symptoms begin - DO NOT go to bed or wait to see if you get better - TIME IS BRAIN. Warning Signs of HEART ATTACK   Call 911 if you have these symptoms:     Chest discomfort. Most heart attacks involve discomfort in the center of the chest that lasts more than a few minutes, or that goes away and comes back. It can feel like uncomfortable pressure, squeezing, fullness, or pain.  Discomfort in other areas of the upper body. Symptoms can include pain or discomfort in one or both arms, the back, neck, jaw, or stomach.  Shortness of breath with or without chest discomfort.  Other signs may include breaking out in a cold sweat, nausea, or lightheadedness. Don't wait more than five minutes to call 911 - MINUTES MATTER! Fast action can save your life. Calling 911 is almost always the fastest way to get lifesaving treatment. Emergency Medical Services staff can begin treatment when they arrive -- up to an hour sooner than if someone gets to the hospital by car. AT THE COMPLETION OF DISCHARGE INSTRUCTION REVIEW, WE VERIFY:  The discharge information has been reviewed with the patient and caregiver. Questions have been asked and answered meeting patient and caregiver expectations. The patient and caregiver verbalized understanding. Your discharge nurse was Lizbeth SMART, RN-BC       Board Certified - Pain Management      Other information found in your discharge envelope:  [x]     PRESCRIPTIONS     [] Sent electronically to your pharmacy  []     PHYSICAL THERAPY PRESCRIPTION  []     APPOINTMENT CARDS  []     Regional Anesthesia Pamphlet for block or block with On-Q Catheter from Anesthesia  Service  []     Medical device information sheets/pamphlets from their    []     School/work excuse note. []     /parent work excuse note.       The following personal items collected during your admission for safe keeping are returned to you:     Dental Appliance: Dental Appliances: None  Vi negra: Visual Aid: None  Hearing Aid:    Jewelry: Jewelry: None  Clothing: Clothing: Footwear, Shirt, Undergarments, Jacket/Coat, Pants  Other Valuables:  Other Valuables: None  Valuables sent to safe: Personal Items Sent to Safe: N/A

## 2019-02-20 NOTE — ANESTHESIA POSTPROCEDURE EVALUATION
Procedure(s): LEFT BREAST LUMPECTOMY WITH NEEDLE LOCALIZATION, LEFT BREAST SENTINEL NODE BIOPSY WITH BLUE DYE. Anesthesia Post Evaluation Multimodal analgesia: multimodal analgesia not used between 6 hours prior to anesthesia start to PACU discharge Patient location during evaluation: PACU Patient participation: complete - patient participated Level of consciousness: awake Pain management: adequate Airway patency: patent Anesthetic complications: no 
Cardiovascular status: acceptable, blood pressure returned to baseline and hemodynamically stable Respiratory status: acceptable Hydration status: acceptable Visit Vitals /88 Pulse 84 Temp 36.7 °C (98 °F) Resp 15 Ht 6' 1\" (1.854 m) Wt 100.2 kg (221 lb) SpO2 99% BMI 29.16 kg/m²

## 2019-02-20 NOTE — ANESTHESIA PREPROCEDURE EVALUATION
Anesthetic History No history of anesthetic complications Review of Systems / Medical History Patient summary reviewed, nursing notes reviewed and pertinent labs reviewed Pulmonary Within defined limits Neuro/Psych Within defined limits Cardiovascular Within defined limits Exercise tolerance: >4 METS 
  
GI/Hepatic/Renal 
Within defined limits Endo/Other Cancer Other Findings Comments: Breast ca Physical Exam 
 
Airway Mallampati: II 
 
Neck ROM: normal range of motion Mouth opening: Normal 
 
 Cardiovascular Regular rate and rhythm,  S1 and S2 normal,  no murmur, click, rub, or gallop Rhythm: regular Rate: normal 
 
 
 
 Dental 
No notable dental hx Pulmonary Breath sounds clear to auscultation Abdominal 
GI exam deferred Other Findings Anesthetic Plan ASA: 2 Anesthesia type: general 
 
 
 
 
Induction: Intravenous Anesthetic plan and risks discussed with: Patient

## 2019-02-25 ENCOUNTER — TELEPHONE (OUTPATIENT)
Dept: SURGERY | Age: 49
End: 2019-02-25

## 2019-02-25 NOTE — TELEPHONE ENCOUNTER
1/2019 LEFT invasive ductal carcinoma, grade 2, %. MN 70%, Her 2 -  2/2019 LEFT lumpectomy. 3mm IDC grade 1.  0/4 nodes,  3 sites of DCIS. + medial and posterior margin  BRCA 1 positive    Called patient. DCIS with positive margins. Node negative. She is having bilateral mastectomies in June, so no need for re-excision.

## 2019-02-28 ENCOUNTER — OFFICE VISIT (OUTPATIENT)
Dept: SURGERY | Age: 49
End: 2019-02-28

## 2019-02-28 VITALS
WEIGHT: 221 LBS | BODY MASS INDEX: 29.93 KG/M2 | HEART RATE: 79 BPM | HEIGHT: 72 IN | SYSTOLIC BLOOD PRESSURE: 138 MMHG | DIASTOLIC BLOOD PRESSURE: 81 MMHG

## 2019-02-28 DIAGNOSIS — C50.412 MALIGNANT NEOPLASM OF UPPER-OUTER QUADRANT OF LEFT BREAST IN FEMALE, ESTROGEN RECEPTOR POSITIVE (HCC): ICD-10-CM

## 2019-02-28 DIAGNOSIS — Z17.0 MALIGNANT NEOPLASM OF UPPER-OUTER QUADRANT OF LEFT BREAST IN FEMALE, ESTROGEN RECEPTOR POSITIVE (HCC): ICD-10-CM

## 2019-02-28 NOTE — PATIENT INSTRUCTIONS

## 2019-02-28 NOTE — PROGRESS NOTES
HISTORY OF PRESENT ILLNESS  Errol Smalls is a 50 y.o. female. HPI  ESTABLISHED patient here for POD #8, s/p LEFT breast lumpectomy and SNBx. She is doing well. Has some tenderness to the axilla incision. Incisions are dry and intact. 1/2019 LEFT invasive ductal carcinoma, grade 2, %. CT 70%, Her 2 -  2/2019 LEFT lumpectomy. 3mm IDC grade 1.  0/4 nodes,  3 sites of DCIS. + medial and posterior margin  BRCA 1 positive    ROS    Physical Exam   Pulmonary/Chest: Left breast exhibits skin change (ecchymosis UOQ ) and tenderness. ASPIRATION OF SEROMA  Indication : Seroma:  left  upper outer quadrant  Prep : Alcohol. Guidance : Ultrasound guidance. Yield :  40 cc from the breast and 3 cc from the axilla of serosanguinous fluid was aspirated with an 18 gauge needle. Effect : Seromas completely aspirated. Tolerance: Pt tolerated the procedure with no discomfort  Disposition:  Follow up in one week if swelling recurs    ASSESSMENT and PLAN    ICD-10-CM ICD-9-CM    1. Malignant neoplasm of upper-outer quadrant of left breast in female, estrogen receptor positive (HCC) C50.412 174.4     Z17.0 V86.0      1. Seroma aspirated  2. Stage 1 breast cancer. Pt has DCIS at her medial and posterior margins, but this will be excised when she has mastectomies  3. Anticipate bilateral mastectomy and reconstruction in June. She was first planning on MAYELIN flaps, but now is considering implant reconstruction. 4. She woud like to lose 30lbs and is concerned about how that could affect reconstruction. 5. She is meeting with Dr Laith Mallory next week  6. She will meet with Dr Katerin Sandoval in March to schedule surgery.

## 2019-03-04 ENCOUNTER — OFFICE VISIT (OUTPATIENT)
Dept: ONCOLOGY | Age: 49
End: 2019-03-04

## 2019-03-04 ENCOUNTER — DOCUMENTATION ONLY (OUTPATIENT)
Dept: ONCOLOGY | Age: 49
End: 2019-03-04

## 2019-03-04 VITALS
SYSTOLIC BLOOD PRESSURE: 144 MMHG | RESPIRATION RATE: 18 BRPM | HEIGHT: 72 IN | WEIGHT: 242.8 LBS | BODY MASS INDEX: 32.89 KG/M2 | TEMPERATURE: 97.7 F | OXYGEN SATURATION: 97 % | HEART RATE: 71 BPM | DIASTOLIC BLOOD PRESSURE: 85 MMHG

## 2019-03-04 DIAGNOSIS — Z15.01 BRCA1 GENE MUTATION POSITIVE: ICD-10-CM

## 2019-03-04 DIAGNOSIS — C50.412 MALIGNANT NEOPLASM OF UPPER-OUTER QUADRANT OF LEFT BREAST IN FEMALE, ESTROGEN RECEPTOR POSITIVE (HCC): Primary | ICD-10-CM

## 2019-03-04 DIAGNOSIS — Z15.09 BRCA1 GENE MUTATION POSITIVE: ICD-10-CM

## 2019-03-04 DIAGNOSIS — Z17.0 MALIGNANT NEOPLASM OF UPPER-OUTER QUADRANT OF LEFT BREAST IN FEMALE, ESTROGEN RECEPTOR POSITIVE (HCC): Primary | ICD-10-CM

## 2019-03-04 RX ORDER — TAMOXIFEN CITRATE 20 MG/1
20 TABLET ORAL DAILY
Qty: 90 TAB | Refills: 3 | Status: SHIPPED | OUTPATIENT
Start: 2019-03-04 | End: 2019-03-04 | Stop reason: SDUPTHER

## 2019-03-04 RX ORDER — MELATONIN
DAILY
COMMUNITY
End: 2019-06-10

## 2019-03-04 RX ORDER — TAMOXIFEN CITRATE 20 MG/1
20 TABLET ORAL DAILY
Qty: 90 TAB | Refills: 3 | Status: SHIPPED | OUTPATIENT
Start: 2019-03-04 | End: 2019-09-30

## 2019-03-04 NOTE — PATIENT INSTRUCTIONS
Tamoxifen (By mouth)   Tamoxifen ([id number])  Treats breast cancer. May prevent breast cancer in women who have a high risk. Brand Name(s): Nolvadex, Soltamox   There may be other brand names for this medicine. When This Medicine Should Not Be Used: You should not use this medicine if you have had an allergic reaction to tamoxifen, or if you are pregnant. You should not use this medicine if you are also using blood thinners (such as warfarin, Coumadin®), or if you have had a blood clot or blood clotting problems. How to Use This Medicine:   Liquid, Tablet  · Medicines used to treat cancer are very strong and can have many side effects. Before receiving this medicine, make sure you understand all the risks and benefits. It is important for you to work closely with your doctor during your treatment. · This medicine should come with a Medication Guide. Ask your pharmacist for a copy if you do not have one. · Your doctor will tell you how much medicine to use. Do not use more than directed. You may need to take this medicine for 5 years or longer. · Swallow the tablet whole. You may take this medicine with or without food. · Measure the oral liquid medicine with a marked measuring spoon, oral syringe, or medicine cup. · Take this medicine at the same time each day. If a dose is missed:   · Take a dose as soon as you remember. If it is almost time for your next dose, wait until then and take a regular dose. Do not take extra medicine to make up for a missed dose. How to Store and Dispose of This Medicine:   · Store the medicine in a closed container at room temperature, away from heat, moisture, and direct light. Do not store in the refrigerator or freezer. · Ask your pharmacist, doctor, or health caregiver about the best way to dispose of any leftover medicine after you have finished your treatment. You will also need to throw away old medicine after the expiration date has passed.   · Keep all medicine out of the reach of children. Never share your medicine with anyone. Drugs and Foods to Avoid:   Ask your doctor or pharmacist before using any other medicine, including over-the-counter medicines, vitamins, and herbal products. · Make sure your doctor knows if you are also using aminoglutethimide (Cytadren®), bromocriptine (Parlodel®), letrozole (Femara®), rifampin (Rifadin®, Rimactane®), or other cancer treatments. · Birth control pills, implants, or shots may not work while you are using tamoxifen. To keep from getting pregnant, use another form of birth control. Other forms include condoms, a diaphragm, or contraceptive foam or jelly. Warnings While Using This Medicine:   · It is not safe to take this medicine during pregnancy. It could harm an unborn baby. Tell your doctor right away if you become pregnant. Keep using effective birth control for at least 2 months after you stop treatment. · To make sure you are not pregnant, you may start taking this medicine while you are having your menstrual period. Also, you must have a negative pregnancy test before you will be allowed to take this medicine. · Make sure your doctor knows if you have liver disease, cataracts, eye or vision problems, hypercalcemia (high calcium in the blood), or high cholesterol or triglycerides (fat) in the blood. · Do not breastfeed while you are using this medicine. · Your doctor will check your progress and the effects of this medicine at regular visits. Keep all appointments. It is important for women to have regular gynecologic check-ups while taking tamoxifen. · Make sure any doctor or dentist who treats you knows that you are using this medicine. · This medicine may increase your risk of developing other rare but serious conditions, such as stroke, a blood clot in the lungs or veins, or cancer of the uterus. Talk with your doctor about these risks and your personal situation.   · This medicine may cause changes in your menstrual periods, which could be a sign of a serious problem. Tell your doctor about any unusual vaginal bleeding or discharge. · Some of the side effects of this medicine may not appear for months or years, or after you have stopped using this medicine. Tell your doctor if you have later side effects. · Liver problems may occur while you are using this medicine. Stop using this medicine and check with your doctor right away if you are having more than one of these symptoms: abdominal pain or tenderness; james-colored stools; dark urine; decreased appetite; fever; headache; itching; loss of appetite; nausea and vomiting; skin rash; swelling of the feet or lower legs; unusual tiredness or weakness; or yellow eyes or skin. Possible Side Effects While Using This Medicine:   Call your doctor right away if you notice any of these side effects:  · Allergic reaction: Itching or hives, swelling in your face or hands, swelling or tingling in your mouth or throat, chest tightness, trouble breathing  · Chest pain, shortness of breath, or coughing up blood. · Dark-colored urine or pale stools. · Fever, chills, cough, sore throat, and body aches. · Heavy or abnormal vaginal bleeding, pelvic pain or pressure. · Nausea, vomiting, loss of appetite, or pain in your upper stomach. · New breast lumps. · Numbness or weakness in your arm or leg, or on one side of your body. · Pain in your lower leg (calf). · Sudden or severe headache, or problems with vision, speech, or walking. · Swelling in your hands, ankles, or feet. · Unusual bleeding, bruising, or weakness. · Yellowing of your skin or the whites of your eyes. If you notice these less serious side effects, talk with your doctor:   · Back or joint pain. · Blurred vision, change in color vision. · Constipation, diarrhea, or stomach pain or upset. · Headache. · Hot flashes, vaginal discharge. · Increased tumor pain or bone pain.   · Loss of interest in sex or trouble having sex (in men). · Rash. · Trouble with sleeping. If you notice other side effects that you think are caused by this medicine, tell your doctor. Call your doctor for medical advice about side effects. You may report side effects to FDA at 6-324-OIE-4780  © 2017 [facility] Information is for End User's use only and may not be sold, redistributed or otherwise used for commercial purposes. The above information is an  only. It is not intended as medical advice for individual conditions or treatments. Talk to your doctor, nurse or pharmacist before following any medical regimen to see if it is safe and effective for you.

## 2019-03-04 NOTE — PROGRESS NOTES
[facility] [place] [facility] [place] [address]  [address] [facility], [address] [facility] 100[address] [facility]  Lynn Bench: [phone]  F: [phone]      Reason for Visit:   Ml Juárez is a 50 y.o. female who is seen in consultation at the request of Dr. Cuba Busby for evaluation of therapy for breast cancer    Consulting physician:  Dr. Kevin Ho    Treatment History:   · 12/31/18 left breast bx:  IDC, gr 2, 3 mm, ER + at 100%, SC + at 70%, HER 2 negative at [facility] 0; DCIS ER + at 100%, SC + at 70%; another site biopsied with ADH  · 2/20/19 L breast lumpectomy:  IDC, gr 1, 3 mm, DCIS present with EIC, papillary, cribriform; medial and posterior margins + for DCIS, 0/4 LN involved; pT1a pN0 cM0    History of Present Illness: An abnormal mammogram led to the above pathology. FH:  Sister with breast cancer at age 40, BRCA 3 +; father BRCA 1 +; PGM with uterine cancer    LMP:  current    Past Medical History:   Diagnosis Date    Rosacea       Past Surgical History:   Procedure Laterality Date    HX BREAST LUMPECTOMY Left 2/20/2019    LEFT BREAST LUMPECTOMY WITH NEEDLE LOCALIZATION, LEFT BREAST SENTINEL NODE BIOPSY WITH BLUE DYE performed by Aylin Bailey MD at [address] HX WISDOM TEETH EXTRACTION  1990      Social History     Tobacco Use    Smoking status: Never Smoker    Smokeless tobacco: Never Used   Substance Use Topics    Alcohol use: Yes     Alcohol/week: 1.2 oz     Types: 1 Cans of beer, 1 Glasses of wine per week     Drinks per session: 1 or 2      Family History   Problem Relation Age of Onset    Breast Cancer Sister         mid 35s   [address] [facility] Cancer Mother         thyroid    Hypertension Mother      Current Outpatient Medications   Medication Sig    cholecalciferol (VITAMIN D3) 1,000 unit tablet Take  by mouth daily.  multivitamin (ONE A DAY) tablet Take 1 Tab by mouth daily.  multivit-min/folic acid/biotin (HAIR,SKIN AND NAILS,FA-BIOTIN, PO) Take 1 Tab by mouth daily.     DOXYCYCLINE HYCLATE PO Take 10 mg by mouth daily. No current facility-administered medications for this visit. Allergies   Allergen Reactions    Penicillins Hives     Pt reports \"I reacted as a child with hives and was told never to take it again\"  \"All- cillins\"        Review of Systems: A complete review of systems was obtained, negative except as described above. Physical Exam:     Visit Vitals  /85   Pulse 71   Temp 97.7 °F (36.5 °C) (Temporal)   Resp 18   Ht 6' 1\" (1.854 m)   Wt 242 lb 12.8 oz (110.1 kg)   LMP 02/27/2019   SpO2 97%   BMI 32.03 kg/m²     ECOG PS: 0  General: No distress  Eyes: PERRLA, anicteric sclerae  HENT: Atraumatic, OP clear  Neck: Supple  Lymphatic: No cervical, supraclavicular adenopathy  Respiratory: CTAB, normal respiratory effort  CV: Normal rate, regular rhythm, no murmurs, no peripheral edema  GI: Soft, nontender, nondistended, no masses, no hepatomegaly, no splenomegaly; + BS  MS:  Digits without clubbing or cyanosis. Skin: No rashes, ecchymoses, or petechiae. Normal temperature, turgor, and texture. Psych: Alert, oriented, appropriate affect, normal judgment/insight    Results:   No results found for: WBC, HGB, HCT, PLT, MCV, ANEU, HGBPOC, HCTPOC, HGBEXT, HCTEXT, PLTEXT, HGBEXT, HCTEXT, PLTEXT  No results found for: NA, K, CL, CO2, GLU, BUN, CREA, GFRAA, GFRNA, CA, NAPOC, KPOCT, CLPOC, GLUCPOC, IBUN, CREAPOC, ICAI  No results found for: TBILI, ALT, SGOT, AP, TP, ALB, GLOB      Records reviewed and summarized above. Pathology report(s) reviewed above. Assessment/plan:   1. Left UOQ IDC, 3 mm, gr 1, 0/4 LN, ER +, IN +, HER 2 negative:  Stage IA (both anatomic and prognostic)    We explained to the patient that the goal of systemic adjuvant therapy is to improve the chances for cure and decrease the risk of relapse.  We explained why a patient can have microscopic cancer spread now even though physical examination, laboratory studies and imaging studies are negative for cancer. We explained that the same treatments used now as adjuvant or preventive treatments rarely if ever are curative in women who develop metastases. With her T1a disease, chemotherapy is not warranted. The risks and benefits of tamoxifen were discussed in detail and the patient was informed of the following: Risks include a 1% risk of endometrial cancer for postmenopausal women treated for five years but no (or a minimally increased) risk in premenopausal women and that most women who develop tamoxifen-associated endometrial cancer can be cured. Any bleeding in a postmenopausal woman should be reported to a health care professional. There is also a 1% risk of blood clots (thromboembolism) that can be fatal. All patients irrespective of age who take tamoxifen and who have not had a hysterectomy should have a pelvic exam and Pap smear yearly. Tamoxifen increases the risk of cataract formation and on rare occasions has caused retinal damage: an eye exam is recommended yearly. Other risks include vaginal discharge or dryness, the development or worsening of hot flashes or vasomotor symptoms, and bone loss in premenopausal women. There is excellent evidence that tamoxifen does not increase risk of depression, cause weight gain or have a major effect on sexual function. Available data suggests little or no effect on cognitive function. Benefits include a lowering of cholesterol and a reduction in the rate of bone loss for postmenopausal woman. Any other symptoms should be reported. Planning for bilateral mastectomies, no indication for XRT. She is agreeable to tamoxifen 20 mg daily, rx in. Will have her stop 2 weeks prior to surgery. Positive margins will be excised when she has bilateral mastectomies, planned with reconstruction, planned for June 18    Follow-up after early breast cancer was discussed.  I recommend follow-up as defined by the American Society of Clinical Oncology and [facility]. This includes a visit to a health care professional every 3-6 months for 3 years, then every 6-12 months for 2 years. 2. Emotional well being:  She has excellent support and is coping well with her disease    3. Pathogenic mutation BRCA1:  N.; she is planning bilateral mastectomies; recommend bilateral oophorectomies as well; referral to gyn onc    I appreciate the opportunity to participate in Ms. Jeronimo Archibald's care. Signed By: An Mccabe MD      No orders of the defined types were placed in this encounter.

## 2019-03-05 NOTE — PROGRESS NOTES
Oncology Navigator  Psychosocial Assessment    Reason for Assessment:    []Depression  []Anxiety  []Caregiver [place]  []Maladaptive Coping with Serious Illness   [x]Other: newly dx: breast cancer    Sources of Information:    [x]Patient  []Family  [x]Staff  [x]Medical Record    Advance Care Planning:  No flowsheet data found.     Mental Status:    [x]Alert  []Lethargic  []Unresponsive  Oriented to:  [x]Person  [x]Place  [x]Time  [x]Situation      Barriers to Learning:    []Language  []Developmental  []Cognitive  []Altered Mental Status  []Visual/Hearing Impairment  []Unable to Read/Write  []Motivational   [x]No Barriers Identified  []Other:    Relationship Status:  []Single  []  []Significant Other/Life Partner  [x]  []  []      Living Circumstances:  []Lives Alone  []Family/Significant Other in Household  []Roommates  [x]Children in the Home  []Paid Caregivers  []Assisted Living Facility/Group Home  []Skilled [address]  []Homeless  []Incarcerated  []Environmental/Care Concerns  []Other:    Support System:    [x]Strong  []Fair  []Limited    Financial/Legal Concerns:    []Uninsured  []Limited Income/Resources  []Non-Citizen  [x]No Concerns Identified  []Financial POA:    []Other:    Church/Spiritual/Existential:  []Strong Sense of Spirituality  []Involved in [employer]  []Request  Visit  []Expressing Lucas Leghorn  [x]No Concerns Identified    Coping with Illness:         Patient: Family/Caregiver:   Understanding and Acceptance of Illness/Prognosis  [x] [x]   Strong Sense of Resilience [x] []   Self Reflection [x] []   Engaged Support System [x] []   Does not Readily Discuss Illness [] []   Denial of Terminal Status [] []   Anger [] []   Depression [] []   Anxiety/Fear [] []   Bargaining [] []   Recent Diagnosis/Prognosis [x] []   Difficulties with Body Image [] []   Loss of Identity [] []   Excessive Substance Use [] []   Mental Health History [] []   Enmeshed Relationships [] []   History of Loss [] []   Anticipatory Grief [] []   Concern for Complicated Grief [] []   Suicidal Ideation or Plan [] []   Unable to assess [] []                  Narrative: Met with patient to introduce social work navigator role and supports. Patient here with her sister, Joao Wilks. Patient presents as pleasant and engaged. Patient works as and . She lives with her two children (ages 15 & 12). She is wells supported with practical and emotional needs by her sister and parents who all live locally. Offered active listening as she ventilates feelings regarding diagnosis. Reports she is coping well with diagnosis and \"ready for mastectomy\" in June. Reviewed barriers to care an none identified at this time. Review supportive resources and programing offered for cancer patient. Patient interested in breast cancer support group for women under 48 and healing art program. Provided her with information on how to attend. Encouraged her to contact me as needed. Referrals:     I. Transportation    Medicaid ([employer]) []   ACS Road to Recovery []                                    Regional organization  []                                      Financial Assistance/Medication Access    Patient assistance program (Care Card) []   Co-pay assistance  []                                    Leukemia & Lymphoma Society []   [address]  []   [address] []   [facility]  []     Emotional support    Peer support group [x]   Local counseling []                                    Online support group []   Coordination of psychiatry consult []     Goals/Plan:   1. Referred patient to breast cancer support group  2. Referred to 'Healing in the Arts' program  3. Ongoing emotional support as desired by patient    Thank you,  Javid Johnson MSW    This note will not be viewable in [address]

## 2019-03-06 ENCOUNTER — TELEPHONE (OUTPATIENT)
Dept: ONCOLOGY | Age: 49
End: 2019-03-06

## 2019-03-06 NOTE — TELEPHONE ENCOUNTER
Pt is scheduled with Dr. Yvette Crooks, Gyn Oncology on 3/14/19 at 3pm at [address] [address] and to call [phone] to reschedule if needed. Called pt and left a detailed message with the above information and requested a return call with any questions or concerns.

## 2019-03-26 ENCOUNTER — TELEPHONE (OUTPATIENT)
Dept: SURGERY | Age: 49
End: 2019-03-26

## 2019-05-02 NOTE — TELEPHONE ENCOUNTER
SURGERY SCHEDULED AT [facility] ON 6-17-19 AT 7:30 AM;  PATIENT TO ARRIVE AT 6 AM TO REGISTER ON THE 2ND FLOOR;  NPO AFTER MIDNIGHT THE NIGHT BEFORE; NOTHING ON THE  SKIN, WILL NEED A . PRE OP TESTING 6-10-19    INSTRUCTIONS TO BE MAILED TO THE PATIENT AND VERBALLY TO BE GIVEN.

## 2019-05-20 ENCOUNTER — TELEPHONE (OUTPATIENT)
Dept: SURGERY | Age: 49
End: 2019-05-20

## 2019-05-20 NOTE — TELEPHONE ENCOUNTER
Returned pt's Hello message from Friday. She had questions about surgery. No answer. Left message with call back number.

## 2019-05-22 ENCOUNTER — DOCUMENTATION ONLY (OUTPATIENT)
Dept: SURGERY | Age: 49
End: 2019-05-22

## 2019-05-24 DIAGNOSIS — C50.412 MALIGNANT NEOPLASM OF UPPER-OUTER QUADRANT OF LEFT BREAST IN FEMALE, ESTROGEN RECEPTOR POSITIVE (HCC): Primary | ICD-10-CM

## 2019-05-24 DIAGNOSIS — Z17.0 MALIGNANT NEOPLASM OF UPPER-OUTER QUADRANT OF LEFT BREAST IN FEMALE, ESTROGEN RECEPTOR POSITIVE (HCC): Primary | ICD-10-CM

## 2019-06-10 ENCOUNTER — HOSPITAL ENCOUNTER (OUTPATIENT)
Dept: PREADMISSION TESTING | Age: 49
Discharge: HOME | End: 2019-06-10
Payer: COMMERCIAL

## 2019-06-10 VITALS
DIASTOLIC BLOOD PRESSURE: 78 MMHG | HEIGHT: 72 IN | WEIGHT: 231.7 LBS | TEMPERATURE: 99.1 F | SYSTOLIC BLOOD PRESSURE: 128 MMHG | OXYGEN SATURATION: 99 % | HEART RATE: 77 BPM | BODY MASS INDEX: 31.38 KG/M2 | RESPIRATION RATE: 20 BRPM

## 2019-06-10 LAB
ABO + RH BLD: NORMAL
BASOPHILS # BLD: 0.1 K/UL (ref 0–0.1)
BASOPHILS NFR BLD: 1 % (ref 0–1)
BLOOD GROUP ANTIBODIES SERPL: NORMAL
DIFFERENTIAL METHOD BLD: ABNORMAL
EOSINOPHIL # BLD: 0.3 K/UL (ref 0–0.4)
EOSINOPHIL NFR BLD: 4 % (ref 0–7)
ERYTHROCYTE [DISTWIDTH] IN BLOOD BY AUTOMATED COUNT: 12.2 % (ref 11.5–14.5)
HCT VFR BLD AUTO: 40.5 % (ref 35–47)
HGB BLD-MCNC: 12.8 G/DL (ref 11.5–16)
IMM GRANULOCYTES # BLD AUTO: 0 K/UL (ref 0–0.04)
IMM GRANULOCYTES NFR BLD AUTO: 1 % (ref 0–0.5)
LYMPHOCYTES # BLD: 1.3 K/UL (ref 0.8–3.5)
LYMPHOCYTES NFR BLD: 22 % (ref 12–49)
MCH RBC QN AUTO: 30.6 PG (ref 26–34)
MCHC RBC AUTO-ENTMCNC: 31.6 G/DL (ref 30–36.5)
MCV RBC AUTO: 96.9 FL (ref 80–99)
MONOCYTES # BLD: 0.3 K/UL (ref 0–1)
MONOCYTES NFR BLD: 6 % (ref 5–13)
NEUTS SEG # BLD: 3.9 K/UL (ref 1.8–8)
NEUTS SEG NFR BLD: 66 % (ref 32–75)
NRBC # BLD: 0 K/UL (ref 0–0.01)
NRBC BLD-RTO: 0 PER 100 WBC
PLATELET # BLD AUTO: 263 K/UL (ref 150–400)
PMV BLD AUTO: 11.6 FL (ref 8.9–12.9)
RBC # BLD AUTO: 4.18 M/UL (ref 3.8–5.2)
SPECIMEN EXP DATE BLD: NORMAL
WBC # BLD AUTO: 5.9 K/UL (ref 3.6–11)

## 2019-06-10 PROCEDURE — 36415 COLL VENOUS BLD VENIPUNCTURE: CPT

## 2019-06-10 PROCEDURE — 85025 COMPLETE CBC W/AUTO DIFF WBC: CPT

## 2019-06-10 PROCEDURE — 86900 BLOOD TYPING SEROLOGIC ABO: CPT

## 2019-06-10 NOTE — PERIOP NOTES
Call placed to Mihir Samayoa at Dr. Marques Yee office, aware that tamoxifen will only be stopped for 1 week prior to surgery (Dr. Jeremy Arndt note states that medication should be stopped for 2 weeks prior to surgery). Call placed to Mihir Samayoa at Dr. Shantelle Lang office, aware that tamoxifen will only be stopped for 1 week prior to surgery (Dr. Jeremy Arndt note states that medication should be stopped for 2 weeks prior to surgery). Call placed to Vanesa Benson at Dr. Jeremy Arndt office, aware that tamoxifen will only be stopped for 1 week prior to surgery.

## 2019-06-10 NOTE — PERIOP NOTES
[address], [address] [facility]                            MAIN OR                                  [phone]   MAIN PRE OP                          [phone]                                                                                AMBULATORY PRE OP          [phone]  PRE-ADMISSION TESTING    [phone]     Surgery Date:   Monday 6/17/19         Is surgery arrival time given by surgeon?   YES - 6am per Dr. Felicia Segura' office      [address]   When Kermit Blush at the 2nd [address] [facility] on the day of your surgery  Have your insurance card, photo ID, and any copayment (if needed)     Food   and   Drink   NO food or drink after midnight the night before surgery    This means NO water, gum, mints, coffee, juice, etc.  No alcohol (beer, wine, liquor) 24 hours before and after surgery     Medications to   TAKE   Morning of Surgery   MEDICATIONS TO TAKE THE MORNING OF SURGERY WITH A SIP OF WATER:    none     Medications  To  STOP      7 days before surgery    Non-Steroidal anti-inflammatory Drugs (NSAID's): for example, Ibuprofen (Advil, Motrin), Naproxen (Aleve)   Aspirin, if taking for pain    Herbal supplements, vitamins, and fish oil   Other: tamoxifen as directed by Dr. Jackson Farooq  (Pain medications not listed above, including Tylenol may be taken)   Blood  Thinners         Bathing Clothing  Jewelry  Valuables       If you shower the morning of surgery, please do not apply anything to your skin (lotions, powders, deodorant, or makeup, especially mascara)      Do not shave or trim anywhere 24 hours before surgery   Wear your hair loose or down; no pony-tails, buns, or metal hair clips   Wear loose, comfortable, clean clothes   Wear glasses instead of contacts  [employer] money, valuables, and jewelry, including body piercings, at home     Going Home       or Spending the Night    SAME-DAY SURGERY: You must have a responsible adult drive you home and stay with you 24 hours after surgery   ADMITS: If your doctor is keeping you into the hospital after surgery, leave personal belongings/luggage in your car until you have a hospital room number. Hospital discharge time is 12 noon  Drivers must be here before 12 noon unless you are told differently   Special Instructions Free  parking 7am-5pm         Follow all instructions so your surgery wont be cancelled. Please, be on time. If a situation occurs and you are delayed the day of surgery, call [phone] or          [phone]. If your physical condition changes (like a fever, cold, flu, etc.) call your surgeon. The patient was contacted  in person. Home medication reviewed and verified during PAT appointment. The patient verbalizes understanding of all instructions and does not  need reinforcement.

## 2019-06-14 ENCOUNTER — ANESTHESIA EVENT (OUTPATIENT)
Dept: SURGERY | Age: 49
End: 2019-06-14
Payer: COMMERCIAL

## 2019-06-17 ENCOUNTER — ANESTHESIA (OUTPATIENT)
Dept: SURGERY | Age: 49
End: 2019-06-17
Payer: COMMERCIAL

## 2019-06-17 ENCOUNTER — HOSPITAL ENCOUNTER (OUTPATIENT)
Age: 49
Setting detail: OBSERVATION
Discharge: HOME | End: 2019-06-18
Attending: SURGERY | Admitting: PLASTIC SURGERY
Payer: COMMERCIAL

## 2019-06-17 DIAGNOSIS — C50.412 MALIGNANT NEOPLASM OF UPPER-OUTER QUADRANT OF LEFT BREAST IN FEMALE, ESTROGEN RECEPTOR POSITIVE (HCC): ICD-10-CM

## 2019-06-17 DIAGNOSIS — Z17.0 MALIGNANT NEOPLASM OF UPPER-OUTER QUADRANT OF LEFT BREAST IN FEMALE, ESTROGEN RECEPTOR POSITIVE (HCC): ICD-10-CM

## 2019-06-17 PROBLEM — Z90.10 ABSENCE OF BREAST: Status: ACTIVE | Noted: 2019-06-17

## 2019-06-17 LAB — HCG UR QL: NEGATIVE

## 2019-06-17 PROCEDURE — 76060000069 HC AMB SURG ANES 4.5 TO 5 HR: Performed by: SURGERY

## 2019-06-17 PROCEDURE — 77010033678 HC OXYGEN DAILY

## 2019-06-17 PROCEDURE — 77030026438 HC STYL ET INTUB CARD -A: Performed by: ANESTHESIOLOGY

## 2019-06-17 PROCEDURE — 77030002933 HC SUT MCRYL J&J -A: Performed by: SURGERY

## 2019-06-17 PROCEDURE — C9290 INJ, BUPIVACAINE LIPOSOME: HCPCS | Performed by: PLASTIC SURGERY

## 2019-06-17 PROCEDURE — 77030027413 HC ADH SKN AESC -B: Performed by: SURGERY

## 2019-06-17 PROCEDURE — 76210000036 HC AMBSU PH I REC 1.5 TO 2 HR: Performed by: SURGERY

## 2019-06-17 PROCEDURE — 77030002996 HC SUT SLK J&J -A: Performed by: SURGERY

## 2019-06-17 PROCEDURE — 74011000250 HC RX REV CODE- 250

## 2019-06-17 PROCEDURE — 74011000272 HC RX REV CODE- 272: Performed by: PLASTIC SURGERY

## 2019-06-17 PROCEDURE — 99218 HC RM OBSERVATION: CPT

## 2019-06-17 PROCEDURE — 74011250636 HC RX REV CODE- 250/636: Performed by: ANESTHESIOLOGY

## 2019-06-17 PROCEDURE — 77030003029 HC SUT VCRL J&J -B: Performed by: SURGERY

## 2019-06-17 PROCEDURE — 88307 TISSUE EXAM BY PATHOLOGIST: CPT

## 2019-06-17 PROCEDURE — 76030000009 HC AMB SURG OR TIME 4.5 TO 5: Performed by: SURGERY

## 2019-06-17 PROCEDURE — 77030018836 HC SOL IRR NACL ICUM -A: Performed by: SURGERY

## 2019-06-17 PROCEDURE — 77030002991 HC SUT QUILL SSPC -B: Performed by: SURGERY

## 2019-06-17 PROCEDURE — 77030008684 HC TU ET CUF COVD -B: Performed by: ANESTHESIOLOGY

## 2019-06-17 PROCEDURE — C1789 PROSTHESIS, BREAST, IMP: HCPCS | Performed by: SURGERY

## 2019-06-17 PROCEDURE — 77030031139 HC SUT VCRL2 J&J -A: Performed by: SURGERY

## 2019-06-17 PROCEDURE — 74011250636 HC RX REV CODE- 250/636

## 2019-06-17 PROCEDURE — 77030008463 HC STPLR SKN PROX J&J -B: Performed by: SURGERY

## 2019-06-17 PROCEDURE — 74011250636 HC RX REV CODE- 250/636: Performed by: PLASTIC SURGERY

## 2019-06-17 PROCEDURE — 74011000258 HC RX REV CODE- 258: Performed by: PLASTIC SURGERY

## 2019-06-17 PROCEDURE — 81025 URINE PREGNANCY TEST: CPT

## 2019-06-17 PROCEDURE — 77030032490 HC SLV COMPR SCD KNE COVD -B

## 2019-06-17 PROCEDURE — 74011000250 HC RX REV CODE- 250: Performed by: PLASTIC SURGERY

## 2019-06-17 PROCEDURE — 74011250637 HC RX REV CODE- 250/637: Performed by: PLASTIC SURGERY

## 2019-06-17 PROCEDURE — 77030002916 HC SUT ETHLN J&J -A: Performed by: SURGERY

## 2019-06-17 PROCEDURE — 77030016570 HC BLNKT BAIR HGGR 3M -B: Performed by: SURGERY

## 2019-06-17 PROCEDURE — 77030020782 HC GWN BAIR PAWS FLX 3M -B

## 2019-06-17 DEVICE — Z DISCONTINUED SEE COMMENTS IMPLANT HUM TISS W96XH1.04-2.28XL193MM ACELLULAR DERM TISS: Type: IMPLANTABLE DEVICE | Site: BREAST | Status: FUNCTIONAL

## 2019-06-17 DEVICE — SMOOTH HIGH PROFILE XTRA 650CC  SMOOTH ROUND SILICONE
Type: IMPLANTABLE DEVICE | Site: BREAST | Status: FUNCTIONAL
Brand: MENTOR MEMORYGEL XTRA BREAST IMPLANT

## 2019-06-17 RX ORDER — LIDOCAINE HYDROCHLORIDE 10 MG/ML
0.1 INJECTION, SOLUTION EPIDURAL; INFILTRATION; INTRACAUDAL; PERINEURAL AS NEEDED
Status: DISCONTINUED | OUTPATIENT
Start: 2019-06-17 | End: 2019-06-17 | Stop reason: HOSPADM

## 2019-06-17 RX ORDER — SODIUM CHLORIDE, SODIUM LACTATE, POTASSIUM CHLORIDE, CALCIUM CHLORIDE 600; 310; 30; 20 MG/100ML; MG/100ML; MG/100ML; MG/100ML
150 INJECTION, SOLUTION INTRAVENOUS CONTINUOUS
Status: DISCONTINUED | OUTPATIENT
Start: 2019-06-17 | End: 2019-06-17 | Stop reason: HOSPADM

## 2019-06-17 RX ORDER — HYDROMORPHONE HYDROCHLORIDE 2 MG/ML
INJECTION, SOLUTION INTRAMUSCULAR; INTRAVENOUS; SUBCUTANEOUS AS NEEDED
Status: DISCONTINUED | OUTPATIENT
Start: 2019-06-17 | End: 2019-06-17 | Stop reason: HOSPADM

## 2019-06-17 RX ORDER — MIDAZOLAM HYDROCHLORIDE 1 MG/ML
INJECTION, SOLUTION INTRAMUSCULAR; INTRAVENOUS AS NEEDED
Status: DISCONTINUED | OUTPATIENT
Start: 2019-06-17 | End: 2019-06-17 | Stop reason: HOSPADM

## 2019-06-17 RX ORDER — CEFAZOLIN SODIUM/WATER 2 G/20 ML
2 SYRINGE (ML) INTRAVENOUS EVERY 8 HOURS
Status: COMPLETED | OUTPATIENT
Start: 2019-06-17 | End: 2019-06-18

## 2019-06-17 RX ORDER — DIPHENHYDRAMINE HYDROCHLORIDE 50 MG/ML
12.5 INJECTION, SOLUTION INTRAMUSCULAR; INTRAVENOUS AS NEEDED
Status: DISCONTINUED | OUTPATIENT
Start: 2019-06-17 | End: 2019-06-17 | Stop reason: HOSPADM

## 2019-06-17 RX ORDER — SODIUM CHLORIDE, SODIUM LACTATE, POTASSIUM CHLORIDE, CALCIUM CHLORIDE 600; 310; 30; 20 MG/100ML; MG/100ML; MG/100ML; MG/100ML
100 INJECTION, SOLUTION INTRAVENOUS CONTINUOUS
Status: DISCONTINUED | OUTPATIENT
Start: 2019-06-17 | End: 2019-06-18

## 2019-06-17 RX ORDER — LIDOCAINE HYDROCHLORIDE 20 MG/ML
INJECTION, SOLUTION EPIDURAL; INFILTRATION; INTRACAUDAL; PERINEURAL AS NEEDED
Status: DISCONTINUED | OUTPATIENT
Start: 2019-06-17 | End: 2019-06-17 | Stop reason: HOSPADM

## 2019-06-17 RX ORDER — ACETAMINOPHEN 500 MG
1000 TABLET ORAL EVERY 6 HOURS
Status: DISPENSED | OUTPATIENT
Start: 2019-06-17 | End: 2019-06-18

## 2019-06-17 RX ORDER — DOCUSATE SODIUM 100 MG/1
100 CAPSULE, LIQUID FILLED ORAL 2 TIMES DAILY
Status: DISCONTINUED | OUTPATIENT
Start: 2019-06-17 | End: 2019-06-18 | Stop reason: HOSPADM

## 2019-06-17 RX ORDER — PROPOFOL 10 MG/ML
INJECTION, EMULSION INTRAVENOUS AS NEEDED
Status: DISCONTINUED | OUTPATIENT
Start: 2019-06-17 | End: 2019-06-17 | Stop reason: HOSPADM

## 2019-06-17 RX ORDER — GABAPENTIN 300 MG/1
300 CAPSULE ORAL 3 TIMES DAILY
Status: DISCONTINUED | OUTPATIENT
Start: 2019-06-17 | End: 2019-06-18 | Stop reason: HOSPADM

## 2019-06-17 RX ORDER — PROPOFOL 10 MG/ML
INJECTION, EMULSION INTRAVENOUS
Status: DISCONTINUED | OUTPATIENT
Start: 2019-06-17 | End: 2019-06-17 | Stop reason: HOSPADM

## 2019-06-17 RX ORDER — HYDROMORPHONE HYDROCHLORIDE 2 MG/ML
0.5 INJECTION, SOLUTION INTRAMUSCULAR; INTRAVENOUS; SUBCUTANEOUS
Status: DISCONTINUED | OUTPATIENT
Start: 2019-06-17 | End: 2019-06-17 | Stop reason: HOSPADM

## 2019-06-17 RX ORDER — ROCURONIUM BROMIDE 10 MG/ML
INJECTION, SOLUTION INTRAVENOUS AS NEEDED
Status: DISCONTINUED | OUTPATIENT
Start: 2019-06-17 | End: 2019-06-17 | Stop reason: HOSPADM

## 2019-06-17 RX ORDER — ALBUTEROL SULFATE 0.83 MG/ML
2.5 SOLUTION RESPIRATORY (INHALATION) AS NEEDED
Status: DISCONTINUED | OUTPATIENT
Start: 2019-06-17 | End: 2019-06-17 | Stop reason: HOSPADM

## 2019-06-17 RX ORDER — ONDANSETRON 2 MG/ML
8 INJECTION INTRAMUSCULAR; INTRAVENOUS
Status: DISCONTINUED | OUTPATIENT
Start: 2019-06-17 | End: 2019-06-18 | Stop reason: HOSPADM

## 2019-06-17 RX ORDER — CEFAZOLIN SODIUM/WATER 2 G/20 ML
2 SYRINGE (ML) INTRAVENOUS ONCE
Status: COMPLETED | OUTPATIENT
Start: 2019-06-17 | End: 2019-06-17

## 2019-06-17 RX ORDER — ONDANSETRON 2 MG/ML
INJECTION INTRAMUSCULAR; INTRAVENOUS AS NEEDED
Status: DISCONTINUED | OUTPATIENT
Start: 2019-06-17 | End: 2019-06-17 | Stop reason: HOSPADM

## 2019-06-17 RX ORDER — DEXAMETHASONE SODIUM PHOSPHATE 4 MG/ML
INJECTION, SOLUTION INTRA-ARTICULAR; INTRALESIONAL; INTRAMUSCULAR; INTRAVENOUS; SOFT TISSUE AS NEEDED
Status: DISCONTINUED | OUTPATIENT
Start: 2019-06-17 | End: 2019-06-17 | Stop reason: HOSPADM

## 2019-06-17 RX ORDER — SUCCINYLCHOLINE CHLORIDE 20 MG/ML
INJECTION INTRAMUSCULAR; INTRAVENOUS AS NEEDED
Status: DISCONTINUED | OUTPATIENT
Start: 2019-06-17 | End: 2019-06-17 | Stop reason: HOSPADM

## 2019-06-17 RX ORDER — HYDROMORPHONE HYDROCHLORIDE 2 MG/1
4 TABLET ORAL
Status: DISCONTINUED | OUTPATIENT
Start: 2019-06-17 | End: 2019-06-18 | Stop reason: HOSPADM

## 2019-06-17 RX ORDER — HYDROMORPHONE HYDROCHLORIDE 2 MG/1
2 TABLET ORAL
Status: DISCONTINUED | OUTPATIENT
Start: 2019-06-17 | End: 2019-06-18 | Stop reason: HOSPADM

## 2019-06-17 RX ORDER — SODIUM CHLORIDE, SODIUM LACTATE, POTASSIUM CHLORIDE, CALCIUM CHLORIDE 600; 310; 30; 20 MG/100ML; MG/100ML; MG/100ML; MG/100ML
125 INJECTION, SOLUTION INTRAVENOUS CONTINUOUS
Status: DISCONTINUED | OUTPATIENT
Start: 2019-06-17 | End: 2019-06-17 | Stop reason: HOSPADM

## 2019-06-17 RX ORDER — HEPARIN SODIUM 5000 [USP'U]/ML
5000 INJECTION, SOLUTION INTRAVENOUS; SUBCUTANEOUS EVERY 12 HOURS
Status: DISCONTINUED | OUTPATIENT
Start: 2019-06-17 | End: 2019-06-18 | Stop reason: HOSPADM

## 2019-06-17 RX ORDER — FENTANYL CITRATE 50 UG/ML
INJECTION, SOLUTION INTRAMUSCULAR; INTRAVENOUS AS NEEDED
Status: DISCONTINUED | OUTPATIENT
Start: 2019-06-17 | End: 2019-06-17 | Stop reason: HOSPADM

## 2019-06-17 RX ORDER — ONDANSETRON 2 MG/ML
4 INJECTION INTRAMUSCULAR; INTRAVENOUS AS NEEDED
Status: DISCONTINUED | OUTPATIENT
Start: 2019-06-17 | End: 2019-06-17 | Stop reason: HOSPADM

## 2019-06-17 RX ADMIN — ROCURONIUM BROMIDE 30 MG: 10 INJECTION, SOLUTION INTRAVENOUS at 08:08

## 2019-06-17 RX ADMIN — LIDOCAINE HYDROCHLORIDE 0.1 ML: 10 INJECTION, SOLUTION EPIDURAL; INFILTRATION; INTRACAUDAL; PERINEURAL at 06:15

## 2019-06-17 RX ADMIN — HYDROMORPHONE HYDROCHLORIDE 0.5 MG: 2 INJECTION INTRAMUSCULAR; INTRAVENOUS; SUBCUTANEOUS at 12:42

## 2019-06-17 RX ADMIN — HEPARIN SODIUM 5000 UNITS: 5000 INJECTION INTRAVENOUS; SUBCUTANEOUS at 20:20

## 2019-06-17 RX ADMIN — MIDAZOLAM HYDROCHLORIDE 2 MG: 1 INJECTION, SOLUTION INTRAMUSCULAR; INTRAVENOUS at 07:36

## 2019-06-17 RX ADMIN — HYDROMORPHONE HYDROCHLORIDE 0.5 MG: 2 INJECTION INTRAMUSCULAR; INTRAVENOUS; SUBCUTANEOUS at 13:24

## 2019-06-17 RX ADMIN — ONDANSETRON 4 MG: 2 INJECTION INTRAMUSCULAR; INTRAVENOUS at 11:33

## 2019-06-17 RX ADMIN — Medication 2 G: at 15:17

## 2019-06-17 RX ADMIN — HEPARIN SODIUM 5000 UNITS: 5000 INJECTION INTRAVENOUS; SUBCUTANEOUS at 15:17

## 2019-06-17 RX ADMIN — PROPOFOL 200 MG: 10 INJECTION, EMULSION INTRAVENOUS at 07:40

## 2019-06-17 RX ADMIN — HYDROMORPHONE HYDROCHLORIDE 1 MG: 2 INJECTION, SOLUTION INTRAMUSCULAR; INTRAVENOUS; SUBCUTANEOUS at 08:04

## 2019-06-17 RX ADMIN — GABAPENTIN 300 MG: 300 CAPSULE ORAL at 22:35

## 2019-06-17 RX ADMIN — PROPOFOL 50 MCG/KG/MIN: 10 INJECTION, EMULSION INTRAVENOUS at 07:54

## 2019-06-17 RX ADMIN — SODIUM CHLORIDE, POTASSIUM CHLORIDE, SODIUM LACTATE AND CALCIUM CHLORIDE: 600; 310; 30; 20 INJECTION, SOLUTION INTRAVENOUS at 10:32

## 2019-06-17 RX ADMIN — FENTANYL CITRATE 50 MCG: 50 INJECTION, SOLUTION INTRAMUSCULAR; INTRAVENOUS at 07:36

## 2019-06-17 RX ADMIN — Medication 2 G: at 07:52

## 2019-06-17 RX ADMIN — HYDROMORPHONE HYDROCHLORIDE 0.5 MG: 2 INJECTION INTRAMUSCULAR; INTRAVENOUS; SUBCUTANEOUS at 13:02

## 2019-06-17 RX ADMIN — IBUPROFEN 600 MG: 400 TABLET, FILM COATED ORAL at 20:20

## 2019-06-17 RX ADMIN — ONDANSETRON 8 MG: 2 INJECTION INTRAMUSCULAR; INTRAVENOUS at 16:30

## 2019-06-17 RX ADMIN — GABAPENTIN 300 MG: 300 CAPSULE ORAL at 15:17

## 2019-06-17 RX ADMIN — SODIUM CHLORIDE, POTASSIUM CHLORIDE, SODIUM LACTATE AND CALCIUM CHLORIDE: 600; 310; 30; 20 INJECTION, SOLUTION INTRAVENOUS at 07:36

## 2019-06-17 RX ADMIN — Medication 2 G: at 22:34

## 2019-06-17 RX ADMIN — DOCUSATE SODIUM 100 MG: 100 CAPSULE, LIQUID FILLED ORAL at 19:21

## 2019-06-17 RX ADMIN — HYDROMORPHONE HYDROCHLORIDE 1 MG: 2 INJECTION, SOLUTION INTRAMUSCULAR; INTRAVENOUS; SUBCUTANEOUS at 09:24

## 2019-06-17 RX ADMIN — DEXAMETHASONE SODIUM PHOSPHATE 4 MG: 4 INJECTION, SOLUTION INTRA-ARTICULAR; INTRALESIONAL; INTRAMUSCULAR; INTRAVENOUS; SOFT TISSUE at 07:52

## 2019-06-17 RX ADMIN — HYDROMORPHONE HYDROCHLORIDE 2 MG: 2 TABLET ORAL at 15:17

## 2019-06-17 RX ADMIN — SUCCINYLCHOLINE CHLORIDE 100 MG: 20 INJECTION INTRAMUSCULAR; INTRAVENOUS at 07:41

## 2019-06-17 RX ADMIN — ROCURONIUM BROMIDE 20 MG: 10 INJECTION, SOLUTION INTRAVENOUS at 07:40

## 2019-06-17 RX ADMIN — LIDOCAINE HYDROCHLORIDE 40 MG: 20 INJECTION, SOLUTION EPIDURAL; INFILTRATION; INTRACAUDAL; PERINEURAL at 07:40

## 2019-06-17 RX ADMIN — SODIUM CHLORIDE, POTASSIUM CHLORIDE, SODIUM LACTATE AND CALCIUM CHLORIDE 150 ML/HR: 600; 310; 30; 20 INJECTION, SOLUTION INTRAVENOUS at 06:00

## 2019-06-17 RX ADMIN — SODIUM CHLORIDE, SODIUM LACTATE, POTASSIUM CHLORIDE, AND CALCIUM CHLORIDE 100 ML/HR: 600; 310; 30; 20 INJECTION, SOLUTION INTRAVENOUS at 16:35

## 2019-06-17 RX ADMIN — ACETAMINOPHEN 1000 MG: 500 TABLET ORAL at 19:21

## 2019-06-17 RX ADMIN — FENTANYL CITRATE 150 MCG: 50 INJECTION, SOLUTION INTRAMUSCULAR; INTRAVENOUS at 07:40

## 2019-06-17 NOTE — DISCHARGE INSTRUCTIONS
Cornell Rinne Verdell Downy, M.D., F.A.C.S. [address] [place] [address]  [phone]    Direct to Implant Breast Reconstruction Post-operative Instructions      Surgical Bra:  You will be in a surgical bra following the procedure. This should be worn at all times except when showering for the first two weeks. The bra should be washed when it gets soiled. If the surgibra is irritating, you may place gauze pads between your skin and the bra for added comfort. After you are discharged home, you may wear a loose-fitting, sports-type bra that has NO UNDERWIRE. This should be worn day and night, except when showering, for the first month. Surgical Drains:  Please refer to the KRISTINE Drain Instructions below for details. Activity:  Take it easy for the first several days. No cleaning, housework, or strenuous activity. Do not lift anything over 10 pounds, including children, and do not lift your hands over your head. You may resume non-vigorous activities at two weeks, then gently increase your activity as tolerated. No running, aerobics, or other strenuous activities until four weeks. Bathing: You may shower one day after the surgery. NO tub baths, hot tubs, swimming, or any submersion in water for one week after removal of all KRISTINE drains. Skin glue will be covering the incisions, just let the shower water run over them, then pat everything dry. Skin glue usually falls off on its own in about 1-2 weeks. You may remove it after two weeks if it is still present. Medication:  You will receive prescriptions for an antibiotic, a narcotic pain medication (usually dilaudid, generic name is hydromorphone), and a non-narcotic pain medication (gabapentin). Take the antibiotic until it is finished. Take  the narcotic pain medication as needed according to the directions. And take the gabapentin until it is gone.   If you are otherwise not taking any narcotic pain medications, you may stop taking the gabapentin if you wish.  Avoid aspirin for two weeks after surgery. You may take ibuprofen (e.g. Advil) according to the package instructions in addition to, or instead of, the prescription pain medicine. Things to watch for: If you experience excessive or sudden swelling, spreading or increasing redness, increasing pain, foul-smelling drainage, separation of any incisions, fever, shaking chills, or any other concerns, please call the office immediately ([phone]). Follow-up appointment: Your follow up appointment is already scheduled with Dr. Atilio Aguilar for Wednesday, June 26th, at 9:30 am, at the [facility] office. KRISTINE Drain Instructions    Purpose: You have had surgery during which a Blake-Ying drain, or KRISTINE drain, has been placed by your surgeon. A KRISTINE drain is a rubber tube which goes under the skin and drains excess fluid during the healing process so that this fluid does not accumulate. There is a one-way valve which allows the fluid to collect in the bulb on the end of the drain. The drain is usually held in place by a single stitch. The color of the drainage can range from reddish to pink to straw-colored. Care of the drain:  Caring for the KRISTINE drain is fairly easy. First, protect the drain so that it does not get pulled inadvertently. You may fasten them to your clothing with a safety pin through the floppy tag on the bulb. DO NOT place a pin through either the drain tubing or the bulb itself. The drain works by maintaining a constant low pressure of suction when the bulb is in the collapsed (squeezed in) position. If the bulb will not maintain this collapsed position when it is recapped, please call the office, as the drain may not be working properly. Measuring the drainage:  Grasp the bulb in one hand and remove the cap with the other hand. This will cause the bulb to relax into a round shape.   Holding the open end over a specimen cup, squirt the fluid into the cup by squeezing the bulb. Once the bulb is empty, squeeze it in (collapse it) with one hand, and replace the cap with your other hand. Then holding the measuring cup level, note how much fluid there is (in milliliters, mL), and record this number on the chart below. Discard the fluid in the toilet and rinse out the specimen cup. Note: your specimen cup may be in cubic centimeters (cc). 1 cc = 1 mL. Removal:  The KRISTINE drain will be removed in the office when the daily drainage is at a low enough level. You may be asked to call the office with your measuring totals to determine if the drain(s) is (are) ready to be removed. BRING THE CHART BELOW WITH YOU TO YOUR OFFICE VISIT. Removal involves minimal discomfort without the need for anesthesia. The drain site in the skin heals on its own once the drain is removed without any further stitches. Just use a bandaid or gauze over the site until it is dry. The site may get wet in the shower. Showering: You may shower after you are discharged home while you have one or more KRISTINE drains in. Just let the water run over the drain sites and then pat them dry when you are done. Some patients like to place a long string necklace around their necks during showers, to which they can safety pin the tag on the bulb to prevent it from dangling. DO NOT take a tub bath, go swimming (pool or lake/ocean), or otherwise submerge your body in water before all KRISTINE drains have been removed and you are permitted by your surgeon.     Things to look out for:  Please call the office immediately ([phone]) if you notice:   Sudden bright or dark red bleeding into the bulb or around the drain site in the skin   Dislodgment of the KRISTINE drain or if the drain falls out   Spreading redness around the drain site in the skin   Cloudy or foul-smelling drainage in the bulb or around the drain site   Fever, shaking chills, excessive swelling, pain or discomfort   Any other concerns or questions        Date           #1 (AM)             #1 (PM)             Daily Total #1  (AM+PM)             #2 (AM)           #2 (PM)           Daily  Total  #2  (AM+PM)

## 2019-06-17 NOTE — PERIOP NOTES
[address] [facility] [address] given to Alee Pereyra RN.    7945  Pt off monitors for transport to room 401. Sister informed. All belongings sent. TRANSFER - OUT REPORT:    Verbal report given to Alee Pereyra RN on Rhiannon Rivas  being transferred to [address] [place] [address] for routine post - op       Report consisted of patients Situation, Background, Assessment and   Recommendations(SBAR). Information from the following report(s) SBAR, OR Summary, MAR and Cardiac Rhythm NSR was reviewed with the receiving nurse. Lines:   Peripheral IV 06/17/19 Left;Posterior Hand (Active)   Site Assessment Clean, dry, & intact 6/17/2019 12:20 PM   Phlebitis Assessment 0 6/17/2019 12:20 PM   Infiltration Assessment 0 6/17/2019 12:20 PM   Dressing Status Clean, dry, & intact 6/17/2019 12:20 PM   Dressing Type Transparent 6/17/2019 12:20 PM   Hub Color/Line Status Pink; Infusing 6/17/2019 12:20 PM   Alcohol Cap Used Yes 6/17/2019 12:20 PM        Opportunity for questions and clarification was provided.       Patient transported with:   Registered Nurse

## 2019-06-17 NOTE — OP NOTES
Reji Clayton [facility] [address]  [address] [facility] [address]    Name: Nigel Gilbert  : 1970    Mastectomy followed by Reconstruction   Operative Report    Date of Surgery: 2019  Preoperative Diagnosis: left Breast DCIS  Postoperative Diagnosis: same  Surgeon: Dr Bebeto Morrow   Anesthesia: General  Procedure:  Procedure(s):  BILATERAL BREAST MASTECTOMIES  IMMEDIATE BILATERAL BREAST RECONSTRUCTION WITH SILICONE GEL IMPLANTS AND ALLODERM GRAFTS   Indication: LEFT breast DCIS. BRCA positive    Procedure Note:  The patient was brought to the operating room and placed on the table in the supine position. She was induced with general anesthesia. The breast and axillae were prepped and draped in the usual sterile manner. The RIGHT prophylactic mastectomy was performed via a bat wing incision. The skin flaps were then elevated medially to the sternum, inferiorly to the inframammary fold, superiorly to the infraclavicular region and laterally to the mid axillary line. The breast was removed from the Pectoralis Major fascia using electrocautery. This was done in a medial to lateral fashion. The breast was removed and marked with sutures for orientation purposes. The specimen was sent to Pathology. The LEFT mastectomy was done in a similar fashion. The bat wing incision included the UOQ lumpectomy scar. At lumpectomy patient had a positive medial and posterior margins. This tissue was excised with the mastectomy. Hemostasis was controlled throughout the procedure using electrocautery. Sponge, needle and instrument counts were reported to be correct. Dr Sarah Nagy then presented for reconstruction.     Findings:  Scar tissue at lumpectomy site  Estimated Blood Loss:  150 cc        Specimens:   ID Type Source Tests Collected by Time Destination   1 : Right Breast Prophylactic Mastectomy Preservative Breast  Roro Calvillo MD 2019 0831 Pathology   2 : Left breast mastectomy Preservative Breast  Edgardo Mckinnon MD 6/17/2019 9740 Pathology      Complications: none  Implants: none  Assistant: Trinity Lima SA    Signed:  Wilfredo Cheney MD

## 2019-06-17 NOTE — H&P
Pre-op History and Physical    CC: BREAST CANCER, LEFT   HPI: 50y.o. year old female with BREAST CANCER, LEFT for Procedure(s):  BILATERAL BREAST MASTECTOMIES  IMMEDIATE BILATERAL BREAST RECONSTRUCTION WITH SILICONE GEL IMPLANTS AND ALLODERM GRAFTS. Past medical history:   Past Medical History:   Diagnosis Date    DCIS (ductal carcinoma in situ) of breast 2019    left    Heart murmur     at birth   [facility] Ill-defined condition 03/2019    hairline fracture, left knee    Precancerous skin lesion 03/2019    mid back    Rosacea       Past surgical history:   Past Surgical History:   Procedure Laterality Date    HX BREAST LUMPECTOMY Left 2/20/2019    LEFT BREAST LUMPECTOMY WITH NEEDLE LOCALIZATION, LEFT BREAST SENTINEL NODE BIOPSY WITH BLUE DYE performed by Angela Piña MD at [address] [facility]    dental procedure    HX WISDOM TEETH EXTRACTION  1990      Family history:   Family History   Problem Relation Age of Onset    Breast Cancer Sister         mid 35s    Cancer Mother         thyroid    Hypertension Mother       Social history:   Social History     Socioeconomic History    Marital status:      Spouse name: Not on file    Number of children: Not on file    Years of education: Not on file    Highest education level: Not on file   Occupational History    Not on file   Social Needs    Financial resource strain: Not on file    Food insecurity:     Worry: Not on file     Inability: Not on file    Transportation needs:     Medical: Not on file     Non-medical: Not on file   Tobacco Use    Smoking status: Never Smoker    Smokeless tobacco: Never Used   Substance and Sexual Activity    Alcohol use:  Yes     Alcohol/week: 1.2 oz     Types: 1 Cans of beer, 1 Glasses of wine per week     Drinks per session: 1 or 2    Drug use: No    Sexual activity: Never   Lifestyle    Physical activity:     Days per week: Not on file     Minutes per session: Not on file    Stress: Not on file Relationships    Social connections:     Talks on phone: Not on file     Gets together: Not on file     Attends Yarsanism service: Not on file     Active member of club or organization: Not on file     Attends meetings of clubs or organizations: Not on file     Relationship status: Not on file    Intimate partner violence:     Fear of current or ex partner: Not on file     Emotionally abused: Not on file     Physically abused: Not on file     Forced sexual activity: Not on file   Other Topics Concern    Not on file   Social History Narrative    Not on file      Home Medications:   Prior to Admission medications    Medication Sig Start Date End Date Taking? Authorizing Provider   BIOTIN PO Take 1 Tab by mouth daily. Yes Provider, Historical   DOXYCYCLINE HYCLATE PO Take 10 mg by mouth daily. Yes Provider, Historical   tamoxifen (NOLVADEX) 20 mg tablet Take 1 Tab by mouth daily. 3/4/19   Oscar Costello MD      Allergies: Allergies   Allergen Reactions    Amoxicillin Nausea Only    Augmentin [Amoxicillin-Pot Clavulanate] Hives    Penicillins Hives     Pt reports \"I reacted as a child with hives and was told never to take it again\"  \"All- cillins\"      Review of systems:  Denies headache, fever, chills, weight change, congestion, sore throat, chest pain, shortness of breath, nausea, vomiting, diarrhea, constipation, abdominal pain, generalized weakness, muscle or joint pain, and rash. Physical Exam:  Vitals: Blood pressure 145/74, pulse 86, temperature 97.7 °F (36.5 °C), resp. rate 20, height 6' 1\" (1.854 m), weight 105.7 kg (233 lb 0.4 oz), last menstrual period 04/30/2019, SpO2 97 %.    General: awake and alert, NAD  Neck: supple  Cor: RRR  Lungs: clear  Abdomen: soft, non-tender, non-distended  Extremities: no edema  Skin: no rash    Impression: BREAST CANCER, LEFT    Plan:  Procedure(s):  BILATERAL BREAST MASTECTOMIES  IMMEDIATE BILATERAL BREAST RECONSTRUCTION WITH SILICONE GEL IMPLANTS AND ALLODERM GRAFTS

## 2019-06-17 NOTE — ANESTHESIA PREPROCEDURE EVALUATION
Relevant Problems   No relevant active problems       Anesthetic History   No history of anesthetic complications            Review of Systems / Medical History  Patient summary reviewed, nursing notes reviewed and pertinent labs reviewed    Pulmonary  Within defined limits                 Neuro/Psych   Within defined limits           Cardiovascular  Within defined limits                Exercise tolerance: >4 METS     GI/Hepatic/Renal  Within defined limits              Endo/Other  Within defined limits      Cancer     Other Findings   Comments: Breast ca           Physical Exam    Airway  Mallampati: II    Neck ROM: normal range of motion   Mouth opening: Normal     Cardiovascular  Regular rate and rhythm,  S1 and S2 normal,  no murmur, click, rub, or gallop  Rhythm: regular  Rate: normal         Dental  No notable dental hx       Pulmonary  Breath sounds clear to auscultation               Abdominal  GI exam deferred       Other Findings            Anesthetic Plan    ASA: 2  Anesthesia type: general          Induction: Intravenous  Anesthetic plan and risks discussed with: Patient

## 2019-06-17 NOTE — ANESTHESIA POSTPROCEDURE EVALUATION
Procedure(s):  BILATERAL BREAST MASTECTOMIES  IMMEDIATE BILATERAL BREAST RECONSTRUCTION WITH SILICONE GEL IMPLANTS AND ALLODERM GRAFTS. general    Anesthesia Post Evaluation        Patient location during evaluation: PACU  Level of consciousness: awake  Pain management: adequate  Airway patency: patent  Anesthetic complications: no  Cardiovascular status: acceptable  Respiratory status: acceptable  Hydration status: acceptable  Post anesthesia nausea and vomiting:  none      Vitals Value Taken Time   /74 6/17/2019  1:05 PM   Temp 36.6 °C (97.9 °F) 6/17/2019 12:14 PM   Pulse 80 6/17/2019  1:07 PM   Resp 5 6/17/2019  1:07 PM   SpO2 93 % 6/17/2019  1:07 PM   Vitals shown include unvalidated device data.

## 2019-06-17 NOTE — BRIEF OP NOTE
BRIEF OPERATIVE NOTE    Date of Procedure: 6/17/2019   Preoperative Diagnosis: BREAST CANCER, LEFT  Postoperative Diagnosis: BREAST CANCER, LEFT    Procedure(s):  BILATERAL BREAST MASTECTOMIES  IMMEDIATE BILATERAL BREAST RECONSTRUCTION WITH SILICONE GEL IMPLANTS AND ALLODERM GRAFTS  Surgeon(s) and Role:   Kyle Bradshaw MD - Primary         Surgical Assistant: Jaspreet Crooks SA    Surgical Staff:  Circ-1: Larry Chung  Circ-Relief: Makayla Gutierrez RN  Scrub Tech-1: Artis Mejia  Scrub RN-Relief: Lawrence Wilkins  Surg Asst-1: Gina Huitron  Float Staff: Makayla Gutierrez RN  Event Time In Time Out   Incision Start 9189    Incision Close 1156      Anesthesia: General   Estimated Blood Loss: 10 mL for reconstructive portion of operation  Specimens:   ID Type Source Tests Collected by Time Destination   1 : Right Breast Prophylactic Mastectomy Preservative Breast  Beth Hemphill MD 6/17/2019 0831 Pathology   2 : Left breast mastectomy Preservative Breast  Beth Hemphill MD 6/17/2019 0905 Pathology      Findings: nothing untoward   Complications: none  Implants:   Implant Name Type Inv.  Item Serial No.  Lot No. LRB No. Used Action   GRAFT TISS ALLODERM PERF MED -- 9.6X19.3CM 132SQ CM - [id number]  GRAFT TISS ALLODERM PERF MED -- 9.6X19.3CM 132SQ CM [id number] [address] [place] [address] [id number] Left 1 Implanted   GRAFT TISS ALLODERM PERF MED -- 9.6X19.3CM 132SQ CM - [id number]  GRAFT TISS ALLODERM PERF MED -- 9.6X19.3CM 132SQ CM [id number] [address] [place] [address] [id number] Left 1 Implanted   IMPL BRST HIGH PROF 650ML -- HIGH PROFILE XTRA - [id number]  IMPL BRST HIGH PROF 650ML -- HIGH PROFILE XTRA [id number] [employer] [id number] Left 1 Implanted   GRAFT TISS ALLODERM PERF MED -- 9.6X19.3CM 132SQ CM - [id number]  GRAFT TISS ALLODERM PERF MED -- 9.6X19.3CM 132SQ CM [id number] [address] [place] [address] [id number] Right 1 Implanted   GRAFT TISS ALLODERM PERF MED -- 9.6X19.3CM 132SQ CM - [id number]  GRAFT TISS ALLODERM PERF MED -- 9.6X19.3CM 132SQ CM [id number] Camelia Starch [id number] Right 1 Implanted   IMPL BRST HIGH PROF 650ML -- HIGH PROFILE XTRA - [id number]  IMPL BRST HIGH PROF 650ML -- HIGH PROFILE XTRA [id number] [employer] [id number] Right 1 Implanted

## 2019-06-17 NOTE — OP NOTES
Name: Alem Sutton  Surgeon: Che Ahmadi MD   Account #: [de-identified] Surgery Date: 2019  : 1970  Age: 50 y.o. Location: [place] [address]    OPERATIVE REPORT     PREOPERATIVE DIAGNOSES:   1. Left breast cancer. 2. Acquired absence of bilateral breasts. 3. Genetic mutation conferring increased risk for breast cancer. POSTOPERATIVE DIAGNOSES:   1. Left breast cancer. 2. Acquired absence of bilateral breasts. 3. Genetic mutation conferring increased risk for breast cancer. OPERATIVE PROCEDURE:   1. Immediate bilateral breast reconstruction with pre-pectoral placement of permanent implants (Direct-To-Implant Reconstruction). 2.  Acellular dermal allografts to bilateral breasts. SURGEON:  Vijay Randall MD    ASSISTANT: Domonique Torrez SA     ANESTHESIA: General endotracheal.     INDICATIONS: The patient is a 50 y.o. female who was diagnosed with a breast cancer on the left side. She had undergone lumpectomy but did not have radiation. After the lumpectomy she was found to carry a BRCA1 genetic mutation conferring increased risk for breast cancer. She was planning on bilateral prophylactic mastectomies with Dr. Mar Robb. She was an excellent candidate for immediate breast reconstruction. The pros and cons of both implant-based reconstruction as well as autologous tissue reconstruction were discussed at length. The decision was made to proceed with direct-to-implant immediate bilateral breast reconstruction with acellular dermal allografts. The possibility of placing temporary tissue expanders instead of permanent implants was discussed pre-operatively. The pros and cons of both silicone and saline filled breast implants were outlined extensively, and the patient wished to place silicone gel-filled implants.   The procedure, as well as the alternatives, possible complications, and anticipated scars were outlined with the patient, and she agrees to proceed, having given her informed and written consent. She understands that post-operative radiotherapy might be indicated, and its possible effects and impact on reconstruction have been discussed. PROCEDURE IN DETAIL: The patient was positively identified in the pre-operative holding area, and she was marked in the upright position for Wise patterns on both breasts, to include each nipple areolar complex. On the left side, she had a concentric lumpectomy scar in the upper outer quadrant which was incorporated into the Borrero pattern. We discussed how this necessarily would create a higher scar on the left side than the right. She received pre-operative intravenous antibiotics. She was taken to the operating room and placed in the supine position with all pressure points padded. Pneumatic compression stockings were applied to bilateral lower extremities. Following successful endotracheal anesthesia, a burger catheter was placed. The arms were secured to padded arm boards. The chest was prepped and draped in the usual sterile fashion. At this point she underwent bilateral skin-sparing mastectomies through Borrero pattern incisions by Dr. Felisha Louis. This will be dictated separately by her. At the conclusion of her portion of the procedure, I presented to the operating room and scrubbed in. A new set of instruments was used. Additional sterile drapes were placed over the existing ones. Attention was first directed to the left side. The breast pocket was inspected and irrigated, and hemostasis assured. The inframammary fold was recreated with interrupted #3-0 vicryl sutures to the chest wall. Next, the breast footprint was marked within the pocket. A piece of contour shape Alloderm ready to use graft was selected and rinsed in saline. It was properly oriented and placed into the breast pocket. It was secured in an inferior sling position with #3-0 vicryl sutures.   A second piece of  contour shape Alloderm ready to use graft was selected and rinsed in saline. It was properly oriented and placed into the breast pocket in an upper pole position, opposite the first piece. It was secured about the periphery with interrupted #3-0 vicryl sutures to the chest wall. Exparel was expanded with 40 mL of 0.25% plain marcaine and 40 mL injectable saline, to a total of 100 mL. Fiftty mL of this mixture was injected laterally in the pocket along the intercostal nerves, as well as into the deep subcutaneous tissues surrounding the breast pocket. The pocket was measured again and a series of gel implant sizers was used to estimate the correct implant size. The sizer was placed, and the free edges of the alloderm were sewn to each other over a mini-fish device. Both ends were left long and loose so the implant could be placed after sizer removal.  The vertical portion of the Wise pattern was approximated with subcuticular #2-0 vicryl sutures. The upper flap was then transposed inferiorly and the appropriate position for its inset marked. The lower flap was then sharply de-epithelialized above that marking. The sizer was removed. The pocket was irrigated with bacitracin saline solution. The proper implant was selected. The surgeon was the only person to handle the implant. It was bathed in bacitracin saline solution. The gel implant was placed with proper orientation in pre-pectoral location within the breast pocket, beneath the alloderm grafts. Closure of the free edges of the two Alloderm grafts was completed for total coverage of the implant. A 19-Fr kraig drain was placed in the dependent portion of the breast pocket, brought out through a separate #15 blade stab incision in the lateral inframammary fold, and secured with a #2-0 nylon suture.   The lower flap dermal portion was secured in a cephalad direction with two #3-0 vicryl sutures to allow for closure in vest-over-pants fashion of the upper flap over the lower.    Next, attention was directed to the contralateral breast pocket. An identical, mirror-image procedure was performed with irrigation of the breast pocket, recreation of the inframammary fold, placement of two pieces of opposing Alloderm grafts inferiorly and superiorly, infiltration of the remainder of the exparel mixture, pre-pectoral placement of a permanent implant, total coverage of the implant with the alloderm, and placement of a drain. The lower flap on the right, however, did not require de-epithelialization. Rather the Borrero pattern was closed directly. A thorough inspection of each breast pocket was performed and hemostasis assured. Skin closure was then performed on each side with a deep dermal layer of interrupted #2-0 and #3-0 vicryl sutures, followed by #4-0 monocryl in running subcuticular fashion along the vertical scars, and with #2-0 monoderm barbed suture in running subcuticular fashion along the horizontal scars. Instrument counts were reported to be correct. The incisions were cleaned, and skin glue was applied. Drains were hooked to bulb suction. A surgical bra was placed after the skin glue was dry. She was allowed to awaken from anesthesia and was extubated without apparent complication. She was taken to the recovery room in excellent condition. Instrument counts were reported to be correct. COMPLICATIONS: None. EBL: 10 mL for reconstructive portion      DRAINS: Mat x2. SPECIMENS: None for reconstructive portion of the procedure. IMPLANT INFORMATION:      RIGHT BREAST: [employer] MemoryGel Xtra Breast Implant, Smooth High Profile Xtra, 650 cc, Reference #SHPX-650, Serial [id number]. Two AlloDerm Select Tissue Matrix RTU, Contour Medium Perforated Thick 132 sq cm grafts, Reference #: [id number]; Lot numbers: [id number], respectively.     LEFT BREAST:  [employer] Xtra Breast Implant, Smooth High Profile Xtra, 650 cc, Reference #SHPX-650, Serial [id number]. Two AlloDerm Select Tissue Matrix RTU, Contour Medium Perforated Thick 132 sq cm grafts, Reference #: [id number]; Lot numbers: [id number], respectively. Harjit Aguilar MD, FACS    CC:  Pretty Paris MD

## 2019-06-18 VITALS
OXYGEN SATURATION: 99 % | SYSTOLIC BLOOD PRESSURE: 106 MMHG | RESPIRATION RATE: 16 BRPM | TEMPERATURE: 98.4 F | HEIGHT: 72 IN | HEART RATE: 77 BPM | DIASTOLIC BLOOD PRESSURE: 64 MMHG | WEIGHT: 233.03 LBS | BODY MASS INDEX: 31.56 KG/M2

## 2019-06-18 PROCEDURE — 74011250636 HC RX REV CODE- 250/636: Performed by: PLASTIC SURGERY

## 2019-06-18 PROCEDURE — 99218 HC RM OBSERVATION: CPT

## 2019-06-18 PROCEDURE — 94760 N-INVAS EAR/PLS OXIMETRY 1: CPT

## 2019-06-18 PROCEDURE — 74011250637 HC RX REV CODE- 250/637: Performed by: PLASTIC SURGERY

## 2019-06-18 RX ADMIN — ACETAMINOPHEN 1000 MG: 500 TABLET ORAL at 05:10

## 2019-06-18 RX ADMIN — HYDROMORPHONE HYDROCHLORIDE 2 MG: 2 TABLET ORAL at 00:26

## 2019-06-18 RX ADMIN — ONDANSETRON 8 MG: 2 INJECTION INTRAMUSCULAR; INTRAVENOUS at 00:25

## 2019-06-18 RX ADMIN — HEPARIN SODIUM 5000 UNITS: 5000 INJECTION INTRAVENOUS; SUBCUTANEOUS at 08:57

## 2019-06-18 RX ADMIN — HYDROMORPHONE HYDROCHLORIDE 2 MG: 2 TABLET ORAL at 05:10

## 2019-06-18 RX ADMIN — IBUPROFEN 600 MG: 400 TABLET, FILM COATED ORAL at 05:10

## 2019-06-18 RX ADMIN — Medication 2 G: at 05:10

## 2019-06-18 RX ADMIN — DOCUSATE SODIUM 100 MG: 100 CAPSULE, LIQUID FILLED ORAL at 08:57

## 2019-06-18 RX ADMIN — ACETAMINOPHEN 1000 MG: 500 TABLET ORAL at 00:26

## 2019-06-18 RX ADMIN — GABAPENTIN 300 MG: 300 CAPSULE ORAL at 08:57

## 2019-06-18 RX ADMIN — HYDROMORPHONE HYDROCHLORIDE 2 MG: 2 TABLET ORAL at 09:11

## 2019-06-18 NOTE — PROGRESS NOTES
POD#1 s/p bilat mastectomies and DTI reconstruction  Feels great  AVSS  JPs serosang  Awake and alert  bilat breasts: incisions CDI; no hematoma; skin flaps viable  Stable  Plan: D/C home today

## 2019-06-18 NOTE — PROGRESS NOTES
6/18/2019  9:01 AM  Reason for Admission:   Elective - breast cancer left                   RRAT Score:   3                  Plan for utilizing home health:      No HH needs indicated. Current Advanced Directive/Advance Care Plan:  Not on file. Pt deferred. Transition of Care Plan:                      CM met with pt for assessment. Demographics and PCP were confirmed. Pt is a 50year old,  female who lives in a private residence with her son and DTR (3 exterior steps, 0 interior steps). PTA, pt was able to complete ADLs without the use of DME. Pt is employed, and insured through [employer] ([employer] [facility]). OBS letter provided and explained (see chart). No additional discharge service needs indicated. CM will continue to monitor for finalized discharge recommendations. Gloria Arce MA    Care Management Interventions  PCP Verified by CM: Yes(Kitchen. No NN to notify. )  Palliative Care Criteria Met (RRAT>21 & CHF Dx)?: No  Mode of Transport at Discharge:  Other (see comment)(Sister. )  MyChart Signup: No  Discharge Durable Medical Equipment: No  Physical Therapy Consult: No  Occupational Therapy Consult: No  Speech Therapy Consult: No  Current Support Network: Lives with Spouse  Confirm Follow Up Transport: Family  Plan discussed with Pt/Family/Caregiver: Yes  Discharge Location  Discharge Placement: Home with family assistance

## 2019-06-18 NOTE — ROUTINE PROCESS
Bedside and Verbal shift change report given to [address] [place] [address] (oncoming nurse) by Archana Guzman RN (offgoing nurse). Report included the following information SBAR and Kardex.

## 2019-06-25 ENCOUNTER — TELEPHONE (OUTPATIENT)
Dept: ONCOLOGY | Age: 49
End: 2019-06-25

## 2019-06-25 NOTE — TELEPHONE ENCOUNTER
Called patient to RS appointment which has been moved to July 30th. Patient did have a few questions in regards to her tamoxifen. She would like to know if she should stay off of it since that is what her breast surgeon said until the follow up with Dr. Shelia Murphy or since the appointment has been pushed out 3 weeks later should she start taking the medication again.  # [phone]

## 2019-06-26 NOTE — TELEPHONE ENCOUNTER
6/26/19 9:46 AM: Returned call to patient to see if her appointment can be rescheduled to next Monday or Tuesday. No answer; left voicemail requesting call back. 6/27/19 11:02 AM: Spoke to patient and she was agreeable to moving her appointment to Tuesday, 7/2, at 2:30 PM. No additional questions at this time.

## 2019-07-02 ENCOUNTER — OFFICE VISIT (OUTPATIENT)
Dept: ONCOLOGY | Age: 49
End: 2019-07-02

## 2019-07-02 VITALS
HEIGHT: 72 IN | BODY MASS INDEX: 32.13 KG/M2 | WEIGHT: 237.2 LBS | SYSTOLIC BLOOD PRESSURE: 138 MMHG | RESPIRATION RATE: 18 BRPM | HEART RATE: 76 BPM | TEMPERATURE: 97.6 F | OXYGEN SATURATION: 100 % | DIASTOLIC BLOOD PRESSURE: 92 MMHG

## 2019-07-02 DIAGNOSIS — Z15.01 BRCA1 GENE MUTATION POSITIVE: ICD-10-CM

## 2019-07-02 DIAGNOSIS — Z15.09 BRCA1 GENE MUTATION POSITIVE: ICD-10-CM

## 2019-07-02 DIAGNOSIS — Z17.0 MALIGNANT NEOPLASM OF UPPER-OUTER QUADRANT OF LEFT BREAST IN FEMALE, ESTROGEN RECEPTOR POSITIVE (HCC): Primary | ICD-10-CM

## 2019-07-02 DIAGNOSIS — C50.412 MALIGNANT NEOPLASM OF UPPER-OUTER QUADRANT OF LEFT BREAST IN FEMALE, ESTROGEN RECEPTOR POSITIVE (HCC): Primary | ICD-10-CM

## 2019-07-02 NOTE — PROGRESS NOTES
[facility] [place] [facility] [place] [address]  [address] [facility], [address]  Sveta Niels: [phone]  F: [phone]      Reason for Visit:   Letitia Gleason is a 50 y.o. female who is seen in consultation at the request of Dr. Quispe Son for evaluation of therapy for breast cancer    Consulting physician:  Dr. Laura Phillips    Treatment History:   · 12/31/18 left breast bx:  IDC, gr 2, 3 mm, ER + at 100%, IA + at 70%, HER 2 negative at [facility] 0; DCIS ER + at 100%, IA + at 70%; another site biopsied with [facility]H  · 2/20/19 L breast lumpectomy:  IDC, gr 1, 3 mm, DCIS present with EIC, papillary, cribriform; medial and posterior margins + for DCIS, 0/4 LN involved; pT1a pN0 cM0  · 6/17/19 bilateral mastectomy: right breast: benign, left breast: residual DCIS, gr 2 with focal necrosis, 12 mm, fibrocystic changes including columnar alteration, usual ductal hyperplasia  · BRCA 1 pathologic mutation  · Tamoxifen 7/2019-    History of Present Illness: An abnormal mammogram led to the above pathology. FH:  Sister with breast cancer at age 40, BRCA 3 +; father BRCA 1 +; PGM with uterine cancer    Interval history: In for follow up. Complains of gr 2 fatigue, gr 3 hot flashes, gr 1 concentration, 7/10 pain in chest r/t surgery, gr 1 neuropathy, gr 1 headache, gr 1 frequency.      LMP:  current    Past Medical History:   Diagnosis Date    DCIS (ductal carcinoma in situ) of breast 2019    left    Heart murmur     at birth   [facility] Ill-defined condition 03/2019    hairline fracture, left knee    Precancerous skin lesion 03/2019    mid back    Rosacea       Past Surgical History:   Procedure Laterality Date    HX BREAST LUMPECTOMY Left 2/20/2019    LEFT BREAST LUMPECTOMY WITH NEEDLE LOCALIZATION, LEFT BREAST SENTINEL NODE BIOPSY WITH BLUE DYE performed by Felicia Armstrong MD at [address] HX BREAST RECONSTRUCTION Bilateral 6/17/2019    IMMEDIATE BILATERAL BREAST RECONSTRUCTION WITH SILICONE GEL IMPLANTS AND ALLODERM GRAFTS performed by Franklin De La O MD at [address]    dental procedure    HX MASTECTOMY Bilateral 6/17/2019    BILATERAL BREAST MASTECTOMIES performed by Nita Javier MD at [address]      Social History     Tobacco Use    Smoking status: Never Smoker    Smokeless tobacco: Never Used   Substance Use Topics    Alcohol use: Yes     Alcohol/week: 1.2 oz     Types: 1 Cans of beer, 1 Glasses of wine per week     Drinks per session: 1 or 2      Family History   Problem Relation Age of Onset    Breast Cancer Sister         mid [address] [place] [address] Cancer Mother         thyroid    Hypertension Mother      Current Outpatient Medications   Medication Sig    DOXYCYCLINE HYCLATE PO Take 100 mg by mouth daily.  tamoxifen (NOLVADEX) 20 mg tablet Take 1 Tab by mouth daily. No current facility-administered medications for this visit. Allergies   Allergen Reactions    Amoxicillin Nausea Only    Augmentin [Amoxicillin-Pot Clavulanate] Hives    Penicillins Hives     Pt reports \"I reacted as a child with hives and was told never to take it again\"  \"All- cillins\"        Review of Systems: A complete review of systems was obtained, negative except as described above. Physical Exam:     Visit Vitals  BP (!) 138/92   Pulse 76   Temp 97.6 °F (36.4 °C) (Temporal)   Resp 18   Ht 6' 1\" (1.854 m)   Wt 237 lb 3.2 oz (107.6 kg)   LMP 04/27/2019   SpO2 100%   BMI 31.29 kg/m²     ECOG PS: 0  General: No distress  Eyes: PERRLA, anicteric sclerae  HENT: Atraumatic, OP clear  Neck: Supple  Lymphatic: No cervical, supraclavicular adenopathy  Respiratory: CTAB, normal respiratory effort  CV: Normal rate, regular rhythm, no murmurs, no peripheral edema  GI: Soft, nontender, nondistended, no masses, no hepatomegaly, no splenomegaly; + BS  MS:  Digits without clubbing or cyanosis. Skin: No rashes, ecchymoses, or petechiae.  Normal temperature, turgor, and texture. Psych: Alert, oriented, appropriate affect, normal judgment/insight    Results:     Lab Results   Component Value Date/Time    WBC 5.9 06/10/2019 10:31 AM    HGB 12.8 06/10/2019 10:31 AM    HCT 40.5 06/10/2019 10:31 AM    PLATELET 776 01/36/0558 10:31 AM    MCV 96.9 06/10/2019 10:31 AM    ABS. NEUTROPHILS 3.9 06/10/2019 10:31 AM     No results found for: NA, K, CL, CO2, GLU, BUN, CREA, GFRAA, GFRNA, CA, NAPOC, KPOCT, CLPOC, GLUCPOC, IBUN, CREAPOC, ICAI  No results found for: TBILI, ALT, SGOT, AP, TP, ALB, GLOB      Records reviewed and summarized above. Pathology report(s) reviewed above. Assessment/plan:   1. Left UOQ IDC, 3 mm, gr 1, 0/4 LN, ER +, MT +, HER 2 negative:  Stage IA (both anatomic and prognostic)    We explained to the patient that the goal of systemic adjuvant therapy is to improve the chances for cure and decrease the risk of relapse. We explained why a patient can have microscopic cancer spread now even though physical examination, laboratory studies and imaging studies are negative for cancer. We explained that the same treatments used now as adjuvant or preventive treatments rarely if ever are curative in women who develop metastases. With her T1a disease, chemotherapy is not warranted. The risks and benefits of tamoxifen were discussed in detail and the patient was informed of the following: Risks include a 1% risk of endometrial cancer for postmenopausal women treated for five years but no (or a minimally increased) risk in premenopausal women and that most women who develop tamoxifen-associated endometrial cancer can be cured. Any bleeding in a postmenopausal woman should be reported to a health care professional. There is also a 1% risk of blood clots (thromboembolism) that can be fatal. All patients irrespective of age who take tamoxifen and who have not had a hysterectomy should have a pelvic exam and Pap smear yearly.  Tamoxifen increases the risk of cataract formation and on rare occasions has caused retinal damage: an eye exam is recommended yearly. Other risks include vaginal discharge or dryness, the development or worsening of hot flashes or vasomotor symptoms, and bone loss in premenopausal women. There is excellent evidence that tamoxifen does not increase risk of depression, cause weight gain or have a major effect on sexual function. Available data suggests little or no effect on cognitive function. Benefits include a lowering of cholesterol and a reduction in the rate of bone loss for postmenopausal woman. Any other symptoms should be reported. bilateral mastectomies, no indication for XRT. She is agreeable to tamoxifen 20 mg daily, rx in. She will resume tamoxifen    Positive margins will be excised when she has bilateral mastectomies, planned with reconstruction, completed on 6/17/19. She has been off tamoxifen since the week prior. Ok to restart Tamoxifen now. She would also like her hormone levels checked to evaluate her menopausal state, FSH, LH and estradiol ordered. Follow-up after early breast cancer was discussed. I recommend follow-up as defined by the American Society of Clinical Oncology and [facility]. This includes a visit to a health care professional every 3-6 months for 3 years, then every 6-12 months for 2 years. 2. Emotional well being:  She has excellent support and is coping well with her disease    3. Pathogenic mutation BRCA1:  X.; she is planning bilateral mastectomies; recommend bilateral oophorectomies as well; referral previously place but appointment was missed due to knee injury. Referral to gyn onc placed again today. 4. Health maintenance: She plans on getting colonoscopy within the next couple months. This patient was seen in conjunction with Charles George NP    I appreciate the opportunity to participate in Ms. Stephani Archibald's care.     Signed By: Gregory Arriaga MD No orders of the defined types were placed in this encounter.

## 2019-07-09 ENCOUNTER — OFFICE VISIT (OUTPATIENT)
Dept: SURGERY | Age: 49
End: 2019-07-09

## 2019-07-09 VITALS
HEIGHT: 72 IN | DIASTOLIC BLOOD PRESSURE: 67 MMHG | WEIGHT: 237 LBS | HEART RATE: 78 BPM | SYSTOLIC BLOOD PRESSURE: 120 MMHG | BODY MASS INDEX: 32.1 KG/M2

## 2019-07-09 DIAGNOSIS — Z90.13 STATUS POST BILATERAL MASTECTOMY: Primary | ICD-10-CM

## 2019-07-09 NOTE — PROGRESS NOTES
HISTORY OF PRESENT ILLNESS  Lona Pratt is a 50 y.o. female. HPI ESTABLISHED patient 3 weeks s/p BILATERAL mastectomies/immediate reconstruction. The RIGHT breast is slightly red and has a larger amount of scabbing. Currently using Bacitracin per Dr. Roro Lindo. Resumed Tamoxifen. She has an appointment next week with Dr. Jordan Payne for consultation about oophorectomies in 10/2019.   1/2019 LEFT invasive ductal carcinoma, grade 2, %. OR 70%, Her 2 -  2/2019 LEFT lumpectomy. 3mm IDC grade 1.  0/4 nodes,  3 sites of DCIS. + medial and posterior margin  Tamoxifen  6/2019 Bilateral mastectomy with silicone implants  BRCA 1 positive    ROS    Physical Exam   Pulmonary/Chest: Right breast exhibits skin change (tisue necrosis at the suture junction. ). Right breast exhibits no mass and no tenderness. Left breast exhibits no mass (slightly puffy UIQ), no skin change and no tenderness. Breasts are symmetrical (bilateral mastectomy with implants). Lymphadenopathy:     She has no cervical adenopathy. She has no axillary adenopathy. Right: No supraclavicular adenopathy present. Left: No supraclavicular adenopathy present. ASSESSMENT and PLAN    ICD-10-CM ICD-9-CM    1. Status post bilateral mastectomy Z90.13 V45.71      LEFT breast cancer, s/p bilateral mastectomy and immediate reconstruction  She has a puffy place upper LEFT breast.  This may resolve in a couple months. She is planning on nipple reconstruction so could be fixed at that time. BRCA positive   On Tamoxifen until her oophorectomy, then consider AI.   She will schedule a colonoscopy  Yearly follow up with Clara Mathews NP

## 2019-07-09 NOTE — PATIENT INSTRUCTIONS
Learning About Breast Cancer Treatments  Your Care Instructions    Breast cancer means abnormal cells grow out of control in one or both breasts. These cancer cells can spread from the breast to nearby lymph nodes and other tissues. They can also spread to other parts of the body. The type and stage of cancer you have is based on:  · Where in the breast the cancer started. · The genetics of those cancer cells. · How far cancer has spread within the breast, to nearby tissues, or to other organs. · What the cancer cells look like under a microscope. · Whether there is cancer in the nearby lymph nodes. Tests that help find the stage of your cancer can help choose the right treatment for you. These tests can include a breast biopsy, lymph node biopsy, blood tests, and X-rays. You may need other tests as well, such as a bone, CT, or MRI scan. Whether you have more tests depends on your symptoms and the stage of the cancer. When you find out that you have cancer, you may feel many emotions and may need some help coping. Seek out family, friends, and counselors for support. You also can do things at home to make yourself feel better while you go through treatment. Call the [employer] (8-106.458.7199) or visit its website at [facility] [facility] for more information. How is breast cancer treated? Your doctor may combine treatments. This is a common way to treat breast cancer. Treatment depends on what type and stage of cancer you have. You may have:  · Surgery to remove the cancer. · Radiation. This uses high-dose X-rays to kill cancer cells and shrink tumors. · Chemotherapy. This uses medicine to kill cancer cells. · Hormone therapy. This uses medicines such as tamoxifen. It limits the effect of the hormone estrogen. This hormone can help some types of breast cancer cells to grow. · Biological therapy.  This uses medicines such as trastuzumab (Herceptin) to help your immune system fight the cancer. What surgeries are done for breast cancer? Surgery is a key part of treatment for breast cancer. The main types of surgeries are:  · Breast-conserving surgery. This is surgery that does not remove the whole breast. This includes:  ? Lumpectomy. This surgery removes the cancer in the breast and some of the normal tissue around it. ? Partial mastectomy. This surgery removes part of the breast. The lining of the chest muscles below the cancer and some of the lymph nodes may also be removed. · Surgery to remove the breast. This includes:  ? Total mastectomy. This removes the whole breast.  ? Nipple-sparing mastectomy. This removes the whole breast but leaves the nipple. ? Modified radical mastectomy. This removes the whole breast and the lymph nodes under the arm (axillary lymph nodes). ? Radical mastectomy. This removes the whole breast, the chest muscles, and all the lymph nodes under the arm. If lymph nodes under the arm are removed, they are looked at under the microscope to check for cancer. There are two types of lymph node removal:  · [place] lymph node biopsy removes the lymph node that is the first to receive lymph fluid from the tumor. If cancer has spread from the breast to the lymph nodes, cancer cells most often show up in the sentinel node first.  · Axillary lymph node dissection removes most of the lymph nodes in the armpit. The type of surgery you have depends on the size, location, and type of the cancer. It also depends on your overall health and personal preferences. Even if your doctor removes all the cancer that can be seen at the time of your surgery, you may still need more treatment. You may get radiation, chemotherapy, or hormone therapy after surgery to try to kill any cancer cells that may be left. No matter what kind of surgery you have, you will get information about why you are having it, what its risks are, how to prepare, and what to do after surgery.   Follow-up care is a key part of your treatment and safety. Be sure to make and go to all appointments, and call your doctor if you are having problems. It's also a good idea to know your test results and keep a list of the medicines you take. Where can you learn more? Go to http://jose carlos-jeanie.info/. Enter X810 in the search box to learn more about \"Learning About Breast Cancer Treatments. \"  Current as of: March 27, 2018  Content Version: 11.9  © 3061-1674 [employer]. Care instructions adapted under license by [employer] (which disclaims liability or warranty for this information). If you have questions about a medical condition or this instruction, always ask your healthcare professional. [address] any warranty or liability for your use of this information.

## 2019-08-15 ENCOUNTER — OFFICE VISIT (OUTPATIENT)
Dept: GYNECOLOGY | Age: 49
End: 2019-08-15

## 2019-08-15 VITALS
DIASTOLIC BLOOD PRESSURE: 76 MMHG | SYSTOLIC BLOOD PRESSURE: 127 MMHG | HEIGHT: 72 IN | HEART RATE: 76 BPM | WEIGHT: 241 LBS | BODY MASS INDEX: 32.64 KG/M2

## 2019-08-15 DIAGNOSIS — Z15.01 BRCA1 POSITIVE: Primary | ICD-10-CM

## 2019-08-15 DIAGNOSIS — Z15.09 BRCA1 POSITIVE: Primary | ICD-10-CM

## 2019-08-15 NOTE — PROGRESS NOTES
[address] [place] [address] Mathias Mor[address], [address] [place] [address]  P [phone]  F [phone]    Office Note  Patient ID:  Name:  Vicente Zelaya  MRN:  [id number]  :  1970/48 y.o. Date:  8/15/2019      HISTORY OF PRESENT ILLNESS:  Vicente Zelaya is a 50 y.o.  premenopausal female who is being seen for a BRCA 1 mutation. She is referred by Dr. Romeo Alcala with medical oncology. She has a diagnosis of breast cancer and recently underwent bilateral mastectomy with reconstruction. Her tumor was ER/PA positive and HER 2 negative. She has a strong family history for malignancy. Her sister was diagnosed with breast cancer at age 40 and found to be BRCA 1 positive. Her father is also BRCA 1 positive. Her paternal grandmother reportedly had a uterine cancer. She is currently on Tamoxifen. I have been asked to see her in consultation for oophorectomy due to her BRCA 1 positivity and risk of ovarian cancer. ROS:   and GI review:  Negative  Cardiopulmonary review:  Negative   Musculoskeletal:  Negative    A comprehensive review of systems was negative except for that written in the History of Present Illness. , 10 point ROS      OB/GYN ROS:    Vaginal delivery x 2  Patient denies any abnormal bleeding or vaginal discharge.        Problem List:  Patient Active Problem List    Diagnosis Date Noted    BRCA1 positive 08/15/2019    Absence of breast 2019    Breast cancer, left ([facility] [address] 2019     PMH:  Past Medical History:   Diagnosis Date    DCIS (ductal carcinoma in situ) of breast     left    Heart murmur     at birth   [facility] Ill-defined condition 2019    hairline fracture, left knee    Precancerous skin lesion 2019    mid back    Rosacea       PSH:  Past Surgical History:   Procedure Laterality Date    HX BREAST LUMPECTOMY Left 2019    LEFT BREAST LUMPECTOMY WITH NEEDLE LOCALIZATION, LEFT BREAST SENTINEL NODE BIOPSY WITH BLUE DYE performed by Tara Fontaine MD at [address] HX BREAST RECONSTRUCTION Bilateral 6/17/2019    IMMEDIATE BILATERAL BREAST RECONSTRUCTION WITH SILICONE GEL IMPLANTS AND ALLODERM GRAFTS performed by Pennie Turcios MD at [address]X [address] [facility]    dental procedure    HX MASTECTOMY Bilateral 6/17/2019    BILATERAL BREAST MASTECTOMIES performed by Tara Fontaine MD at [address]      Social History:  Social History     Tobacco Use    Smoking status: Never Smoker    Smokeless tobacco: Never Used   Substance Use Topics    Alcohol use: Yes     Alcohol/week: 2.0 standard drinks     Types: 1 Cans of beer, 1 Glasses of wine per week     Drinks per session: 1 or 2      Family History:  Family History   Problem Relation Age of Onset    Breast Cancer Sister         mid 35s    Cancer Mother         thyroid    Hypertension Mother       Medications: (reviewed)  Current Outpatient Medications   Medication Sig    tamoxifen (NOLVADEX) 20 mg tablet Take 1 Tab by mouth daily.  DOXYCYCLINE HYCLATE PO Take 100 mg by mouth daily. No current facility-administered medications for this visit. Allergies: (reviewed)  Allergies   Allergen Reactions    Amoxicillin Nausea Only    Augmentin [Amoxicillin-Pot Clavulanate] Hives    Penicillins Hives     Pt reports \"I reacted as a child with hives and was told never to take it again\"  \"All- cillins\"          OBJECTIVE:    Physical Exam:  VITAL SIGNS: Vitals:    08/15/19 0829   BP: 127/76   Pulse: 76   Weight: 241 lb (109.3 kg)   Height: 6' 0.99\" (1.854 m)     Body mass index is 31.8 kg/m². GENERAL RUSTY: Conversant, alert, oriented. No acute distress. HEENT: HEENT. No thyroid enlargement. No JVD. Neck: Supple without restrictions. RESPIRATORY: Clear to auscultation and percussion to the bases. No CVAT. CARDIOVASC: RRR without murmur/rub.    GASTROINT: soft, non-tender, without masses or organomegaly MUSCULOSKEL: no joint tenderness, deformity or swelling   EXTREMITIES: extremities normal, atraumatic, no cyanosis or edema   PELVIC: Vulva and vagina appear normal. Bimanual exam reveals normal uterus and adnexa. RECTAL: Deferred   CHALO SURVEY: No suspicious lymphadenopathy or edema noted. NEURO: Grossly intact. No acute deficit. Lab Data:    Lab Results   Component Value Date/Time    WBC 5.9 06/10/2019 10:31 AM    HGB 12.8 06/10/2019 10:31 AM    HCT 40.5 06/10/2019 10:31 AM    PLATELET 900 36/15/1497 10:31 AM    MCV 96.9 06/10/2019 10:31 AM     No results found for: NA, K, CL, CO2, AGAP, GLU, BUN, CREA, BUCR, GFRAA, GFRNA, CA      Imaging:  None      IMPRESSION/PLAN:  Jordna Phelps is a 50 y.o. female who is BRCA 1 positive. I reviewed with Jordan Phelps her medical records, physical exam, and review of symptoms. We discussed her lifetime risk of developing ovarian cancer and we discussed the guidelines regarding oophorectomy with BRCA mutations. There is no recommendations at this time regarding hysterectomy at the same time, although some women choose to do so at the same time. She will also benefit from having her ovaries removed due to the fact that her breast cancer was ER/CT positive. She is currently on Tamoxifen and would be able to transition to an aromatase inhibitor once she is menopausal.  She would like to proceed with BSO, but she would like to hold off on hysterectomy, since she would like to reduce her recovery time as much as possible. She was counseled on the risks, benefits, indications, and alternatives of laparoscopic BSO. Her questions were answered and she wishes to proceed.          Signed By: Nafisa Romo MD     8/15/2019/8:40 AM

## 2019-08-15 NOTE — LETTER
8/15/2019 9:11 AM 
 
Patient:  Mina Mcknight YOB: 1970 Date of Visit: 8/15/2019 Dear London Degroot MD 
[address] [facility] [phone] [facility] [address] VIA In Basket Yolande Murphy NP 
[address] [address] [address] [facility] [address] VIA Facsimile: [phone] Bhavana Matt MD 
[facility] [address] [facility] [address] VIA In Basket 
 : Thank you for referring Ms. Miguel Mosquera to me for evaluation/treatment. Below are the relevant portions of my assessment and plan of care. New patient referred by Santo Silver to discuss surgery for BRCA1 mutation. [address], [address] [facility], [address] 
P [phone]  F [phone] Office Note Patient ID: 
Name:  Mina Mcknight MRN:  [id number] :  1970/48 y.o. Date:  8/15/2019 HISTORY OF PRESENT ILLNESS: 
Mina Mcknight is a 50 y.o.  premenopausal female who is being seen for a BRCA 1 mutation. She is referred by Dr. Mahi Dubose with medical oncology. She has a diagnosis of breast cancer and recently underwent bilateral mastectomy with reconstruction. Her tumor was ER/N[place] positive and HER 2 negative. She has a strong family history for malignancy. Her sister was diagnosed with breast cancer at age 40 and found to be BRCA 1 positive. Her father is also BRCA 1 positive. Her paternal grandmother reportedly had a uterine cancer. She is currently on Tamoxifen. I have been asked to see her in consultation for oophorectomy due to her BRCA 1 positivity and risk of ovarian cancer. ROS: 
 and GI review:  Negative Cardiopulmonary review:  Negative Musculoskeletal:  Negative A comprehensive review of systems was negative except for that written in the History of Present Illness. , 10 point ROS 
 
 
OB/GYN ROS: 
 Vaginal delivery x 2 Patient denies any abnormal bleeding or vaginal discharge.   
 
 
Problem List: 
 Patient Active Problem List  
 Diagnosis Date Noted  BRCA1 positive 08/15/2019  Absence of breast 06/17/2019  Breast cancer, left ([address] 01/09/2019 PMH: 
Past Medical History:  
Diagnosis Date  DCIS (ductal carcinoma in situ) of breast 2019  
 left  Heart murmur   
 at birth  Ill-defined condition 03/2019  
 hairline fracture, left knee  Precancerous skin lesion 03/2019  
 mid back  Rosacea PSH: 
Past Surgical History:  
Procedure Laterality Date  HX BREAST LUMPECTOMY Left 2/20/2019 LEFT BREAST LUMPECTOMY WITH NEEDLE LOCALIZATION, LEFT BREAST SENTINEL NODE BIOPSY WITH BLUE DYE performed by Jackie Wiggins MD at [address]  
 HX BREAST RECONSTRUCTION Bilateral 6/17/2019 IMMEDIATE BILATERAL BREAST RECONSTRUCTION WITH SILICONE GEL IMPLANTS AND ALLODERM GRAFTS performed by Debbie Noble MD at [address]  
 dental procedure  HX MASTECTOMY Bilateral 6/17/2019 BILATERAL BREAST MASTECTOMIES performed by Jackie Wiggins MD at [facility] [address] Social History: 
Social History Tobacco Use  Smoking status: Never Smoker  Smokeless tobacco: Never Used Substance Use Topics  Alcohol use: Yes Alcohol/week: 2.0 standard drinks Types: 1 Cans of beer, 1 Glasses of wine per week Drinks per session: 1 or 2 Family History: 
Family History Problem Relation Age of Onset  Breast Cancer Sister   
     mid 35s  Cancer Mother   
     thyroid  Hypertension Mother Medications: (reviewed) Current Outpatient Medications Medication Sig  tamoxifen (NOLVADEX) 20 mg tablet Take 1 Tab by mouth daily.  DOXYCYCLINE HYCLATE PO Take 100 mg by mouth daily. No current facility-administered medications for this visit. Allergies: (reviewed) Allergies Allergen Reactions  Amoxicillin Nausea Only  Augmentin [Amoxicillin-Pot Clavulanate] Hives  Penicillins Hives Pt reports \"I reacted as a child with hives and was told never to take it again\" \"All- cillins\" OBJECTIVE: 
 
Physical Exam: VITAL SIGNS: Vitals:  
 08/15/19 2803 BP: 127/76 Pulse: 76 Weight: 241 lb (109.3 kg) Height: 6' 0.99\" (1.854 m) Body mass index is 31.8 kg/m². GENERAL RUSTY: Conversant, alert, oriented. No acute distress. HEENT: HEENT. No thyroid enlargement. No JVD. Neck: Supple without restrictions. RESPIRATORY: Clear to auscultation and percussion to the bases. No CVAT. CARDIOVASC: RRR without murmur/rub. GASTROINT: soft, non-tender, without masses or organomegaly MUSCULOSKEL: no joint tenderness, deformity or swelling EXTREMITIES: extremities normal, atraumatic, no cyanosis or edema PELVIC: Vulva and vagina appear normal. Bimanual exam reveals normal uterus and adnexa. RECTAL: Deferred CHALO SURVEY: No suspicious lymphadenopathy or edema noted. NEURO: Grossly intact. No acute deficit. Lab Data: 
 
Lab Results Component Value Date/Time WBC 5.9 06/10/2019 10:31 AM  
 HGB 12.8 06/10/2019 10:31 AM  
 HCT 40.5 06/10/2019 10:31 AM  
 PLATELET 023 71/76/4762 10:31 AM  
 MCV 96.9 06/10/2019 10:31 AM  
 
No results found for: NA, K, CL, CO2, AGAP, GLU, BUN, CREA, BUCR, GFRAA, GFRNA, CA Imaging: 
None IMPRESSION/PLAN: 
Preston Earl is a 50 y.o. female who is BRCA 1 positive. I reviewed with Preston Earl her medical records, physical exam, and review of symptoms. We discussed her lifetime risk of developing ovarian cancer and we discussed the guidelines regarding oophorectomy with BRCA mutations. There is no recommendations at this time regarding hysterectomy at the same time, although some women choose to do so at the same time. She will also benefit from having her ovaries removed due to the fact that her breast cancer was ER/CO positive.   She is currently on Tamoxifen and would be able to transition to an aromatase inhibitor once she is menopausal.  She would like to proceed with BSO, but she would like to hold off on hysterectomy, since she would like to reduce her recovery time as much as possible. She was counseled on the risks, benefits, indications, and alternatives of laparoscopic BSO. Her questions were answered and she wishes to proceed. Signed By: Lon Michaud MD   
 8/15/2019/8:40 AM  
 
 
 
If you have questions, please do not hesitate to call me. I look forward to following Ms. Kishor Jacinto along with you.  
 
 
 
Sincerely, 
 
 
Lon Michaud MD

## 2019-08-15 NOTE — PATIENT INSTRUCTIONS
Tutor Activation    Thank you for requesting access to Tutor. Please follow the instructions below to securely access and download your online medical record. Tutor allows you to send messages to your doctor, view your test results, renew your prescriptions, schedule appointments, and more. How Do I Sign Up? 1. In your internet browser, go to [email or web address] [email or web address]. 2. Click on the First Time User? Click Here link in the Sign In box. You will see the New Member Sign Up page. 3. Enter your Tutor Access Code exactly as it appears below. You will not need to use this code after youve completed the sign-up process. If you do not sign up before the expiration date, you must request a new code. Tutor Access Code: [email or web address]  Expires: 2019  8:32 AM (This is the date your Tutor access code will )    4. Enter the last four digits of your Social Security Number (xxxx) and Date of Birth (mm/dd/yyyy) as indicated and click Submit. You will be taken to the next sign-up page. 5. Create a Tutor ID. This will be your Tutor login ID and cannot be changed, so think of one that is secure and easy to remember. 6. Create a Tutor password. You can change your password at any time. 7. Enter your Password Reset Question and Answer. This can be used at a later time if you forget your password. 8. Enter your e-mail address. You will receive e-mail notification when new information is available in [address] 9. Click Sign Up. You can now view and download portions of your medical record. 10. Click the Download Summary menu link to download a portable copy of your medical information. Additional Information    If you have questions, please visit the Frequently Asked Questions section of the Tutor website at [email or web address] Remember, Tutor is NOT to be used for urgent needs. For medical emergencies, dial 911.

## 2019-08-16 ENCOUNTER — TELEPHONE (OUTPATIENT)
Dept: GYNECOLOGY | Age: 49
End: 2019-08-16

## 2019-08-16 NOTE — TELEPHONE ENCOUNTER
Pt is scheduled for surgery with Dr. Carol Paige on 9/20/19 at 7:30. She states she will rely on her 80year old parents for transportation and they would have to leave at 4:30 to get her here by 6 am.  She needs a 9:30 surgery slot instead of 7:30. Please call her at [phone] on Monday 8/19/19 to reschedule. 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

## 2019-08-27 ENCOUNTER — TELEPHONE (OUTPATIENT)
Dept: GYNECOLOGY | Age: 49
End: 2019-08-27

## 2019-08-27 NOTE — TELEPHONE ENCOUNTER
Left message for patient to return my call. Dr. Rudie Schlatter would like for her to r/s her sx to 9/11 due to his case load on the 9th.

## 2019-09-30 ENCOUNTER — OFFICE VISIT (OUTPATIENT)
Dept: ONCOLOGY | Age: 49
End: 2019-09-30

## 2019-09-30 ENCOUNTER — HOSPITAL ENCOUNTER (OUTPATIENT)
Dept: PREADMISSION TESTING | Age: 49
Discharge: HOME | End: 2019-09-30
Payer: COMMERCIAL

## 2019-09-30 VITALS
HEART RATE: 81 BPM | TEMPERATURE: 99.4 F | WEIGHT: 241 LBS | BODY MASS INDEX: 32.64 KG/M2 | HEIGHT: 72 IN | DIASTOLIC BLOOD PRESSURE: 91 MMHG | RESPIRATION RATE: 20 BRPM | OXYGEN SATURATION: 97 % | SYSTOLIC BLOOD PRESSURE: 147 MMHG

## 2019-09-30 VITALS
TEMPERATURE: 99.4 F | SYSTOLIC BLOOD PRESSURE: 129 MMHG | WEIGHT: 239 LBS | HEIGHT: 72 IN | HEART RATE: 74 BPM | BODY MASS INDEX: 32.37 KG/M2 | DIASTOLIC BLOOD PRESSURE: 76 MMHG

## 2019-09-30 DIAGNOSIS — C50.412 MALIGNANT NEOPLASM OF UPPER-OUTER QUADRANT OF LEFT BREAST IN FEMALE, ESTROGEN RECEPTOR POSITIVE (HCC): Primary | ICD-10-CM

## 2019-09-30 DIAGNOSIS — Z78.0 POSTMENOPAUSAL: ICD-10-CM

## 2019-09-30 DIAGNOSIS — Z17.0 MALIGNANT NEOPLASM OF UPPER-OUTER QUADRANT OF LEFT BREAST IN FEMALE, ESTROGEN RECEPTOR POSITIVE (HCC): Primary | ICD-10-CM

## 2019-09-30 LAB
ALBUMIN SERPL-MCNC: 3.6 G/DL (ref 3.5–5)
ALBUMIN/GLOB SERPL: 1.1 {RATIO} (ref 1.1–2.2)
ALP SERPL-CCNC: 62 U/L (ref 45–117)
ALT SERPL-CCNC: 22 U/L (ref 12–78)
ANION GAP SERPL CALC-SCNC: 5 MMOL/L (ref 5–15)
AST SERPL-CCNC: 14 U/L (ref 15–37)
BASOPHILS # BLD: 0.1 K/UL (ref 0–0.1)
BASOPHILS NFR BLD: 1 % (ref 0–1)
BILIRUB SERPL-MCNC: 0.3 MG/DL (ref 0.2–1)
BUN SERPL-MCNC: 9 MG/DL (ref 6–20)
BUN/CREAT SERPL: 13 (ref 12–20)
CALCIUM SERPL-MCNC: 9.2 MG/DL (ref 8.5–10.1)
CHLORIDE SERPL-SCNC: 111 MMOL/L (ref 97–108)
CO2 SERPL-SCNC: 26 MMOL/L (ref 21–32)
CREAT SERPL-MCNC: 0.67 MG/DL (ref 0.55–1.02)
DIFFERENTIAL METHOD BLD: NORMAL
EOSINOPHIL # BLD: 0.2 K/UL (ref 0–0.4)
EOSINOPHIL NFR BLD: 4 % (ref 0–7)
ERYTHROCYTE [DISTWIDTH] IN BLOOD BY AUTOMATED COUNT: 11.7 % (ref 11.5–14.5)
GLOBULIN SER CALC-MCNC: 3.2 G/DL (ref 2–4)
GLUCOSE SERPL-MCNC: 122 MG/DL (ref 65–100)
HCT VFR BLD AUTO: 41.1 % (ref 35–47)
HGB BLD-MCNC: 13 G/DL (ref 11.5–16)
IMM GRANULOCYTES # BLD AUTO: 0 K/UL (ref 0–0.04)
IMM GRANULOCYTES NFR BLD AUTO: 0 % (ref 0–0.5)
LYMPHOCYTES # BLD: 1.1 K/UL (ref 0.8–3.5)
LYMPHOCYTES NFR BLD: 24 % (ref 12–49)
MCH RBC QN AUTO: 30.5 PG (ref 26–34)
MCHC RBC AUTO-ENTMCNC: 31.6 G/DL (ref 30–36.5)
MCV RBC AUTO: 96.5 FL (ref 80–99)
MONOCYTES # BLD: 0.3 K/UL (ref 0–1)
MONOCYTES NFR BLD: 6 % (ref 5–13)
NEUTS SEG # BLD: 2.9 K/UL (ref 1.8–8)
NEUTS SEG NFR BLD: 65 % (ref 32–75)
NRBC # BLD: 0 K/UL (ref 0–0.01)
NRBC BLD-RTO: 0 PER 100 WBC
PLATELET # BLD AUTO: 258 K/UL (ref 150–400)
PMV BLD AUTO: 11.9 FL (ref 8.9–12.9)
POTASSIUM SERPL-SCNC: 4.1 MMOL/L (ref 3.5–5.1)
PROT SERPL-MCNC: 6.8 G/DL (ref 6.4–8.2)
RBC # BLD AUTO: 4.26 M/UL (ref 3.8–5.2)
SODIUM SERPL-SCNC: 142 MMOL/L (ref 136–145)
WBC # BLD AUTO: 4.5 K/UL (ref 3.6–11)

## 2019-09-30 PROCEDURE — 85025 COMPLETE CBC W/AUTO DIFF WBC: CPT

## 2019-09-30 PROCEDURE — 36415 COLL VENOUS BLD VENIPUNCTURE: CPT

## 2019-09-30 PROCEDURE — 80053 COMPREHEN METABOLIC PANEL: CPT

## 2019-09-30 RX ORDER — CHOLECALCIFEROL (VITAMIN D3) 125 MCG
CAPSULE ORAL DAILY
COMMUNITY
End: 2020-09-29 | Stop reason: ALTCHOICE

## 2019-09-30 RX ORDER — ANASTROZOLE 1 MG/1
1 TABLET ORAL DAILY
Qty: 90 TAB | Refills: 3 | Status: SHIPPED | OUTPATIENT
Start: 2019-09-30 | End: 2020-07-21

## 2019-09-30 RX ORDER — IBUPROFEN 200 MG
TABLET ORAL
COMMUNITY

## 2019-09-30 NOTE — PROGRESS NOTES
[facility] at [address], [address] [place] [address] [place]: [phone]  F: [phone]      Reason for Visit:   Billy Lynn is a 50 y.o. female who is seen in consultation at the request of Dr. Arti Tao for evaluation of therapy for breast cancer    Consulting physician:  Dr. Ana Ford    Treatment History:   · 12/31/18 left breast bx:  IDC, gr 2, 3 mm, ER + at 100%, AR + at 70%, HER 2 negative at [facility] 0; DCIS ER + at 100%, AR + at 70%; another site biopsied with ADH  · 2/20/19 L breast lumpectomy:  IDC, gr 1, 3 mm, DCIS present with EIC, papillary, cribriform; medial and posterior margins + for DCIS, 0/4 LN involved; pT1a pN0 cM0  · 6/17/19 bilateral mastectomy: right breast: benign, left breast: residual DCIS, gr 2 with focal necrosis, 12 mm, fibrocystic changes including columnar alteration, usual ductal hyperplasia  · BRCA 1 pathologic mutation  · Tamoxifen 7/2019-9/30/19  · Anastrozole 10/2019-    History of Present Illness: An abnormal mammogram led to the above pathology. FH:  Sister with breast cancer at age 40, BRCA 3 +; father BRCA 1 +; PGM with uterine cancer    Interval history: In for follow up. Complains of gr 2 fatigue, 3 hot flashes/day; gr 2 insomnia, gr 1 anxiety, 6/10 joint pain in her knees, ankles, shoulders, gr 1-2 headache.     LMP: 4/27/19    Past Medical History:   Diagnosis Date    BRCA1-associated protein-1 tumor predisposition syndrome     Cancer (HCC)     BREAST, LEFT    DCIS (ductal carcinoma in situ) of breast 2019    left    Heart murmur     at birth   [facility] Ill-defined condition 03/2019    hairline fracture, left knee    Precancerous skin lesion 03/2019    mid back    Rosacea       Past Surgical History:   Procedure Laterality Date    HX BREAST LUMPECTOMY Left 2/20/2019    LEFT BREAST LUMPECTOMY WITH NEEDLE LOCALIZATION, LEFT BREAST SENTINEL NODE BIOPSY WITH BLUE DYE performed by Joy Georges MD at [address] HX BREAST RECONSTRUCTION Bilateral 6/17/2019    IMMEDIATE BILATERAL BREAST RECONSTRUCTION WITH SILICONE GEL IMPLANTS AND ALLODERM GRAFTS performed by Maria Guadalupe Martinez MD at [facility]    dental procedure    HX MASTECTOMY Bilateral 6/17/2019    BILATERAL BREAST MASTECTOMIES performed by Cata Schofield MD at [address] [address] HX [address] EXTRACTION  1990      Social History     Tobacco Use    Smoking status: Never Smoker    Smokeless tobacco: Never Used   Substance Use Topics    Alcohol use: Yes     Alcohol/week: 2.0 standard drinks     Types: 1 Glasses of wine, 1 Cans of beer per week     Drinks per session: 1 or 2     Comment: OCCAS. Family History   Problem Relation Age of Onset    Breast Cancer Sister         mid 35s   [address] Post-op Nausea/Vomiting Sister     Cancer Mother         thyroid    Hypertension Mother     Post-op Nausea/Vomiting Mother     Heart Disease Father         cabg 3 VESSEL     Current Outpatient Medications   Medication Sig    cholecalciferol, vitamin D3, (VITAMIN D3) 2,000 unit tab Take  by mouth daily.  ibuprofen (ADVIL) 200 mg tablet Take  by mouth every six (6) hours as needed for Pain.  tamoxifen (NOLVADEX) 20 mg tablet Take 1 Tab by mouth daily.  DOXYCYCLINE HYCLATE PO Take 100 mg by mouth daily. ROSEACEA     No current facility-administered medications for this visit. Allergies   Allergen Reactions    Amoxicillin Nausea Only    Augmentin [Amoxicillin-Pot Clavulanate] Hives    Penicillins Hives     Pt reports \"I reacted as a child with hives and was told never to take it again\"  \"All- cillins\"        Review of Systems: A complete review of systems was obtained, negative except as described above.     Physical Exam:     Visit Vitals  Ht 6' 1\" (1.854 m)   Wt 241 lb (109.3 kg)   BMI 31.80 kg/m²     ECOG PS: 0  General: No distress  Eyes: PERRLA, anicteric sclerae  HENT: Atraumatic, OP clear  Neck: Supple  Lymphatic: No cervical, supraclavicular adenopathy  Respiratory: CTAB, normal respiratory effort  CV: Normal rate, regular rhythm, no murmurs, no peripheral edema  GI: Soft, nontender, nondistended, no masses, no hepatomegaly, no splenomegaly; + BS  MS:  Digits without clubbing or cyanosis. Skin: No rashes, ecchymoses, or petechiae. Normal temperature, turgor, and texture. Psych: Alert, oriented, appropriate affect, normal judgment/insight    Results:     Lab Results   Component Value Date/Time    WBC 5.9 06/10/2019 10:31 AM    HGB 12.8 06/10/2019 10:31 AM    HCT 40.5 06/10/2019 10:31 AM    PLATELET 526 50/34/4506 10:31 AM    MCV 96.9 06/10/2019 10:31 AM    ABS. NEUTROPHILS 3.9 06/10/2019 10:31 AM     No results found for: NA, K, CL, CO2, GLU, BUN, CREA, GFRAA, GFRNA, CA, NAPOC, KPOCT, CLPOC, GLUCPOC, IBUN, CREAPOC, ICAI  No results found for: TBILI, ALT, SGOT, AP, TP, ALB, GLOB      Records reviewed and summarized above. Pathology report(s) reviewed above. Assessment/plan:   1. Left UOQ IDC, 3 mm, gr 1, 0/4 LN, ER +, MA +, HER 2 negative:  Stage IA (both anatomic and prognostic)    We explained to the patient that the goal of systemic adjuvant therapy is to improve the chances for cure and decrease the risk of relapse. We explained why a patient can have microscopic cancer spread now even though physical examination, laboratory studies and imaging studies are negative for cancer. We explained that the same treatments used now as adjuvant or preventive treatments rarely if ever are curative in women who develop metastases. With her T1a disease, chemotherapy is not warranted. bilateral mastectomies, no indication for XRT. She is agreeable to tamoxifen 20 mg daily, rx in. Having joint pains that are interfering with her QOL. Will stop tamoxifen and change to anastrozole 1 mg daily 2 weeks after her ovaries are removed.     The risks and benefits of aromatase inhibitors (anastrozole, letrozole, and exemestane) were discussed in detail and the patient was informed of the following: Risks include the development of painful muscles and joints (arthralgia/myalgia) and bone loss. Muscle and joint pain can be severe but rarely result in any tissue damage; symptoms usually resolve in several weeks when the medication is stopped. Bone loss is common and a bone density test is recommended as a baseline and then yearly to every several years depending on initial results. The risk of fractures is increased by a few percent in patients taking these drugs, but careful monitoring of bone density and using bone protecting agents when indicated can minimize these risks. Unlike tamoxifen there is no increased risk of blood clots or endometrial cancer. AIs can cause or worsen vaginal dryness but women using these drugs should not use vaginal estrogen preparations for these symptoms. AIs can also cause or increase hot flashes. Any other symptoms should be reported. dexa ordered. She is agreeable to anastrozole 1 mg daily, rx in    2. Emotional well being:  She has excellent support and is coping well with her disease    3. Pathogenic mutation BRCA1:  B.; she is planning bilateral mastectomies; recommend bilateral oophorectomies as well; Saw Dr. Jovanna Hill on 8/15/19, has surgery on 10/4    4. Joint pain:  advil helps, but worse in the past month; will stop tamoxifen and change to anastrozole in 2 weeks following her bilateral oophorectomies    I appreciate the opportunity to participate in Ms. Pennie Archibald's care. Signed By: Celina Levin MD      No orders of the defined types were placed in this encounter.

## 2019-09-30 NOTE — PROGRESS NOTES
Enrique Barrera is a 50 y.o. female follow up for breast cancer. 1. Have you been to the ER, urgent care clinic since your last visit? Hospitalized since your last visit?no     2. Have you seen or consulted any other health care providers outside of the [address] [place] [address] since your last visit? Include any pap smears or colon screening. Yes- - surgery scheduled Friday.

## 2019-09-30 NOTE — PATIENT INSTRUCTIONS
Anastrozole (By mouth)   Anastrozole (an-AS-troe-zole)  Treats breast cancer. Brand Name(s): Arimidex   There may be other brand names for this medicine. When This Medicine Should Not Be Used: This medicine is not right for everyone. Do not use it if you had an allergic reaction to anastrozole, if you are pregnant, or if you have not stopped menstruating (premenopausal). How to Use This Medicine:   Tablet  · Medicines used to treat cancer are very strong and can have many side effects. Before receiving this medicine, make sure you understand all the risks and benefits. It is important for you to work closely with your doctor during your treatment. · Your doctor will tell you how much medicine to use. Do not use more than directed. · Read and follow the patient instructions that come with this medicine. Talk to your doctor or pharmacist if you have any questions. · Missed dose: Take a dose as soon as you remember. If it is almost time for your next dose, wait until then and take a regular dose. Do not take extra medicine to make up for a missed dose. · If you vomit after taking your medicine, call your doctor or pharmacist for instructions. · Store the medicine in a closed container at room temperature, away from heat, moisture, and direct light. · Ask your pharmacist, doctor, or health caregiver about the best way to dispose of any leftover medicine after you have finished your treatment. You will also need to throw away old medicine after the expiration date has passed. Drugs and Foods to Avoid:   Ask your doctor or pharmacist before using any other medicine, including over-the-counter medicines, vitamins, and herbal products. · Some medicines can affect how anastrozole works.  Tell your doctor if you are taking any of the following:   ¨ Medicine that contains estrogen  ¨ Tamoxifen  Warnings While Using This Medicine:   · Pregnancy after menopause is not likely, but if you think you could be pregnant, tell your doctor. This medicine could harm an unborn baby. · Tell your doctor if you are breastfeeding, or if you have liver disease, bone problems (such as osteoporosis), high cholesterol, or heart disease. · This medicine may cause the following problems:   ¨ Increased risk for weak bones or osteoporosis  ¨ Increased cholesterol levels  ¨ Increased risk for heart attack or stroke  · Your doctor will do lab tests at regular visits to check on the effects of this medicine. Keep all appointments. · Cancer medicine can cause nausea or vomiting, sometimes even after you receive medicine to prevent these effects. Ask your doctor or nurse about other ways to control any nausea or vomiting that might happen. · Keep all medicine out of the reach of children. Never share your medicine with anyone. Possible Side Effects While Using This Medicine:   Call your doctor right away if you notice any of these side effects:  · Allergic reaction: Itching or hives, swelling in your face or hands, swelling or tingling in your mouth or throat, chest tightness, trouble breathing  · Back, bone, joint, or muscle pain  · Blistering, peeling, or red skin rash  · Chest pain or shortness of breath  · Fever, chills, cough, sore throat, and body aches  · Numbness, tingling, or burning pain in your hands, arms, legs, or feet  · Rapid weight gain, or swelling in your hands, ankles, or feet  · Severe diarrhea, nausea, or vomiting  · Vaginal bleeding or discharge  · Yellowing of your skin or the whites of your eyes  If you notice these less serious side effects, talk with your doctor:   · Breast pain  · Dizziness, headache, tiredness, or weakness  · Mood changes, anxiety, or irritability  · Trouble sleeping  · Warmth or redness in your face, neck, arms, or upper chest  If you notice other side effects that you think are caused by this medicine, tell your doctor. Call your doctor for medical advice about side effects.  You may report side effects to FDA at 9-878-FDA-3196  © 2017 [facility] Information is for End User's use only and may not be sold, redistributed or otherwise used for commercial purposes. The above information is an  only. It is not intended as medical advice for individual conditions or treatments. Talk to your doctor, nurse or pharmacist before following any medical regimen to see if it is safe and effective for you.

## 2019-09-30 NOTE — PERIOP NOTES
PREOPERATIVE INSTRUCTIONS REVIEWED WITH PATIENT. PATIENT GIVEN TWO-- BOTTLES CHG SOAP. INSTRUCTIONS REVIEWED ON USE OF CHG SOAP. PATIENT GIVEN SSI INFECTIONS SHEET. PATIENT WAS GIVEN THE OPPORTUNITY TO ASK QUESTIONS ON THE INFORMATION PROVIDED.

## 2019-10-02 PROBLEM — Z90.10 ABSENCE OF BREAST: Status: RESOLVED | Noted: 2019-06-17 | Resolved: 2019-10-02

## 2019-10-03 NOTE — H&P
[address] [place] [address] Mathias Moritz [address], [address] [place] [address]  P [phone]  F [phone]      Patient ID:  Name:  Gabby Ramirez  MRN:  [id number]  :  1970/48 y.o. Date:  10/3/2019      HISTORY OF PRESENT ILLNESS:  Gabby Ramirez is a 50 y.o.  premenopausal female who is being seen for a BRCA 1 mutation. She is referred by Dr. Caro Shaw with medical oncology. She has a diagnosis of breast cancer and recently underwent bilateral mastectomy with reconstruction. Her tumor was ER/WI positive and HER 2 negative. She has a strong family history for malignancy. Her sister was diagnosed with breast cancer at age 40 and found to be BRCA 1 positive. Her father is also BRCA 1 positive. Her paternal grandmother reportedly had a uterine cancer. She is currently on Tamoxifen. I have been asked to see her in consultation for oophorectomy due to her BRCA 1 positivity and risk of ovarian cancer. ROS:   and GI review:  Negative  Cardiopulmonary review:  Negative   Musculoskeletal:  Negative    A comprehensive review of systems was negative except for that written in the History of Present Illness. , 10 point ROS      OB/GYN ROS:    Vaginal delivery x 2  Patient denies any abnormal bleeding or vaginal discharge.        Problem List:  Patient Active Problem List    Diagnosis Date Noted    BRCA1 positive 08/15/2019    Breast cancer, left ([facility] [address] 2019     PMH:  Past Medical History:   Diagnosis Date    BRCA1-associated protein-1 tumor predisposition syndrome     Cancer ([facility] [address]     BREAST, LEFT    DCIS (ductal carcinoma in situ) of breast     left    Heart murmur     at birth   [de-identified] Ill-defined condition 2019    hairline fracture, left knee    Precancerous skin lesion 2019    mid back    Rosacea       PSH:  Past Surgical History:   Procedure Laterality Date    HX BREAST LUMPECTOMY Left 2019    LEFT BREAST LUMPECTOMY WITH NEEDLE LOCALIZATION, LEFT BREAST SENTINEL NODE BIOPSY WITH BLUE DYE performed by Felix Braxton MD at [address] HX BREAST RECONSTRUCTION Bilateral 6/17/2019    IMMEDIATE BILATERAL BREAST RECONSTRUCTION WITH SILICONE GEL IMPLANTS AND ALLODERM GRAFTS performed by Michael Collier MD at [address] HX [address]    dental procedure    HX MASTECTOMY Bilateral 6/17/2019    BILATERAL BREAST MASTECTOMIES performed by Felix Braxton MD at [address]      Social History:  Social History     Tobacco Use    Smoking status: Never Smoker    Smokeless tobacco: Never Used   Substance Use Topics    Alcohol use: Yes     Alcohol/week: 2.0 standard drinks     Types: 1 Glasses of wine, 1 Cans of beer per week     Drinks per session: 1 or 2     Comment: OCCAS. Family History:  Family History   Problem Relation Age of Onset    Breast Cancer Sister         mid 35s   Baker Post-op Nausea/Vomiting Sister     Cancer Mother         thyroid    Hypertension Mother     Post-op Nausea/Vomiting Mother     Heart Disease Father         cabg 3 VESSEL      Medications: (reviewed)  No current facility-administered medications for this encounter. Current Outpatient Medications   Medication Sig    cholecalciferol, vitamin D3, (VITAMIN D3) 2,000 unit tab Take  by mouth daily.  ibuprofen (ADVIL) 200 mg tablet Take  by mouth every six (6) hours as needed for Pain.  anastrozole (ARIMIDEX) 1 mg tablet Take 1 mg by mouth daily.  DOXYCYCLINE HYCLATE PO Take 100 mg by mouth daily. ROSEACEA     Allergies: (reviewed)  Allergies   Allergen Reactions    Amoxicillin Nausea Only    Augmentin [Amoxicillin-Pot Clavulanate] Hives    Penicillins Hives     Pt reports \"I reacted as a child with hives and was told never to take it again\"  \"All- cillins\"          OBJECTIVE:    Physical Exam:  VITAL SIGNS: There were no vitals filed for this visit.   There is no height or weight on file to calculate BMI.   GENERAL RUSTY: Conversant, alert, oriented. No acute distress. HEENT: HEENT. No thyroid enlargement. No JVD. Neck: Supple without restrictions. RESPIRATORY: Clear to auscultation and percussion to the bases. No CVAT. CARDIOVASC: RRR without murmur/rub. GASTROINT: soft, non-tender, without masses or organomegaly   MUSCULOSKEL: no joint tenderness, deformity or swelling   EXTREMITIES: extremities normal, atraumatic, no cyanosis or edema   PELVIC: Vulva and vagina appear normal. Bimanual exam reveals normal uterus and adnexa. RECTAL: Deferred   CHALO SURVEY: No suspicious lymphadenopathy or edema noted. NEURO: Grossly intact. No acute deficit. Lab Data:    Lab Results   Component Value Date/Time    WBC 4.5 09/30/2019 09:42 AM    HGB 13.0 09/30/2019 09:42 AM    HCT 41.1 09/30/2019 09:42 AM    PLATELET 685 95/30/9405 09:42 AM    MCV 96.5 09/30/2019 09:42 AM     Lab Results   Component Value Date/Time    Sodium 142 09/30/2019 09:42 AM    Potassium 4.1 09/30/2019 09:42 AM    Chloride 111 (H) 09/30/2019 09:42 AM    CO2 26 09/30/2019 09:42 AM    Anion gap 5 09/30/2019 09:42 AM    Glucose 122 (H) 09/30/2019 09:42 AM    BUN 9 09/30/2019 09:42 AM    Creatinine 0.67 09/30/2019 09:42 AM    BUN/Creatinine ratio 13 09/30/2019 09:42 AM    GFR est AA >60 09/30/2019 09:42 AM    GFR est non-AA >60 09/30/2019 09:42 AM    Calcium 9.2 09/30/2019 09:42 AM         Imaging:  None      IMPRESSION/PLAN:  Rosita Bowen is a 50 y.o. female who is BRCA 1 positive. I reviewed with Rosita Bowen her medical records, physical exam, and review of symptoms. We discussed her lifetime risk of developing ovarian cancer and we discussed the guidelines regarding oophorectomy with BRCA mutations. There is no recommendations at this time regarding hysterectomy at the same time, although some women choose to do so at the same time.   She will also benefit from having her ovaries removed due to the fact that her breast cancer was ER/SD positive. She is currently on Tamoxifen and would be able to transition to an aromatase inhibitor once she is menopausal.  She would like to proceed with BSO, but she would like to hold off on hysterectomy, since she would like to reduce her recovery time as much as possible. She was counseled on the risks, benefits, indications, and alternatives of laparoscopic BSO. Her questions were answered and she wishes to proceed. Signed By: Toni Najera MD     10/3/2019/8:40 AM     Date of Surgery Update:  Cora Hollis was seen and examined. History and physical has been reviewed. The patient has been examined. There have been no significant clinical changes since the completion of the originally dated History and Physical.  Patient identified by surgeon; surgical site was confirmed by patient and surgeon.     Signed By: Toni Najera MD     October 4, 2019 8:59 AM

## 2019-10-04 ENCOUNTER — ANESTHESIA (OUTPATIENT)
Dept: SURGERY | Age: 49
End: 2019-10-04
Payer: COMMERCIAL

## 2019-10-04 ENCOUNTER — HOSPITAL ENCOUNTER (OUTPATIENT)
Age: 49
Setting detail: OUTPATIENT SURGERY
Discharge: HOME | End: 2019-10-04
Attending: OBSTETRICS & GYNECOLOGY | Admitting: OBSTETRICS & GYNECOLOGY
Payer: COMMERCIAL

## 2019-10-04 ENCOUNTER — ANESTHESIA EVENT (OUTPATIENT)
Dept: SURGERY | Age: 49
End: 2019-10-04
Payer: COMMERCIAL

## 2019-10-04 VITALS
OXYGEN SATURATION: 100 % | WEIGHT: 239 LBS | TEMPERATURE: 98.1 F | HEIGHT: 72 IN | BODY MASS INDEX: 32.37 KG/M2 | DIASTOLIC BLOOD PRESSURE: 73 MMHG | HEART RATE: 63 BPM | SYSTOLIC BLOOD PRESSURE: 130 MMHG | RESPIRATION RATE: 18 BRPM

## 2019-10-04 DIAGNOSIS — G89.18 ACUTE POSTOPERATIVE PAIN: Primary | ICD-10-CM

## 2019-10-04 LAB — HCG UR QL: NEGATIVE

## 2019-10-04 PROCEDURE — 76210000016 HC OR PH I REC 1 TO 1.5 HR: Performed by: OBSTETRICS & GYNECOLOGY

## 2019-10-04 PROCEDURE — 77030010507 HC ADH SKN DERMBND J&J -B: Performed by: OBSTETRICS & GYNECOLOGY

## 2019-10-04 PROCEDURE — 77030040830 HC CATH URETH FOL MDII -A: Performed by: OBSTETRICS & GYNECOLOGY

## 2019-10-04 PROCEDURE — 74011000250 HC RX REV CODE- 250: Performed by: OBSTETRICS & GYNECOLOGY

## 2019-10-04 PROCEDURE — 77030026438 HC STYL ET INTUB CARD -A: Performed by: ANESTHESIOLOGY

## 2019-10-04 PROCEDURE — 77030013079 HC BLNKT BAIR HGGR 3M -A: Performed by: ANESTHESIOLOGY

## 2019-10-04 PROCEDURE — 77030031139 HC SUT VCRL2 J&J -A: Performed by: OBSTETRICS & GYNECOLOGY

## 2019-10-04 PROCEDURE — 88331 PATH CONSLTJ SURG 1 BLK 1SPC: CPT

## 2019-10-04 PROCEDURE — 74011250636 HC RX REV CODE- 250/636: Performed by: ANESTHESIOLOGY

## 2019-10-04 PROCEDURE — 77030009851 HC PCH RTVR ENDOSC AMR -B: Performed by: OBSTETRICS & GYNECOLOGY

## 2019-10-04 PROCEDURE — 76210000021 HC REC RM PH II 0.5 TO 1 HR: Performed by: OBSTETRICS & GYNECOLOGY

## 2019-10-04 PROCEDURE — 74011000250 HC RX REV CODE- 250: Performed by: NURSE ANESTHETIST, CERTIFIED REGISTERED

## 2019-10-04 PROCEDURE — 74011250637 HC RX REV CODE- 250/637: Performed by: ANESTHESIOLOGY

## 2019-10-04 PROCEDURE — 77030040922 HC BLNKT HYPOTHRM STRY -A

## 2019-10-04 PROCEDURE — 76060000034 HC ANESTHESIA 1.5 TO 2 HR: Performed by: OBSTETRICS & GYNECOLOGY

## 2019-10-04 PROCEDURE — 77030011640 HC PAD GRND REM COVD -A: Performed by: OBSTETRICS & GYNECOLOGY

## 2019-10-04 PROCEDURE — 77030008606 HC TRCR ENDOSC KII AMR -B: Performed by: OBSTETRICS & GYNECOLOGY

## 2019-10-04 PROCEDURE — 77030034154 HC SHR COAG HARM ACE J&J -F: Performed by: OBSTETRICS & GYNECOLOGY

## 2019-10-04 PROCEDURE — 81025 URINE PREGNANCY TEST: CPT

## 2019-10-04 PROCEDURE — 77030002933 HC SUT MCRYL J&J -A: Performed by: OBSTETRICS & GYNECOLOGY

## 2019-10-04 PROCEDURE — 74011000258 HC RX REV CODE- 258: Performed by: NURSE ANESTHETIST, CERTIFIED REGISTERED

## 2019-10-04 PROCEDURE — 77030012770 HC TRCR OPT FX AMR -B: Performed by: OBSTETRICS & GYNECOLOGY

## 2019-10-04 PROCEDURE — 74011250636 HC RX REV CODE- 250/636: Performed by: NURSE ANESTHETIST, CERTIFIED REGISTERED

## 2019-10-04 PROCEDURE — 77030040361 HC SLV COMPR DVT MDII -B: Performed by: OBSTETRICS & GYNECOLOGY

## 2019-10-04 PROCEDURE — 36415 COLL VENOUS BLD VENIPUNCTURE: CPT

## 2019-10-04 PROCEDURE — 76010000153 HC OR TIME 1.5 TO 2 HR: Performed by: OBSTETRICS & GYNECOLOGY

## 2019-10-04 PROCEDURE — 88305 TISSUE EXAM BY PATHOLOGIST: CPT

## 2019-10-04 PROCEDURE — 88112 CYTOPATH CELL ENHANCE TECH: CPT

## 2019-10-04 PROCEDURE — 77030008756 HC TU IRR SUC STRY -B: Performed by: OBSTETRICS & GYNECOLOGY

## 2019-10-04 PROCEDURE — 77030020829: Performed by: OBSTETRICS & GYNECOLOGY

## 2019-10-04 PROCEDURE — 77030008684 HC TU ET CUF COVD -B: Performed by: ANESTHESIOLOGY

## 2019-10-04 PROCEDURE — 77030018836 HC SOL IRR NACL ICUM -A: Performed by: OBSTETRICS & GYNECOLOGY

## 2019-10-04 PROCEDURE — 77030020263 HC SOL INJ SOD CL0.9% LFCR 1000ML: Performed by: OBSTETRICS & GYNECOLOGY

## 2019-10-04 RX ORDER — LIDOCAINE HYDROCHLORIDE 10 MG/ML
0.1 INJECTION, SOLUTION EPIDURAL; INFILTRATION; INTRACAUDAL; PERINEURAL AS NEEDED
Status: DISCONTINUED | OUTPATIENT
Start: 2019-10-04 | End: 2019-10-04 | Stop reason: HOSPADM

## 2019-10-04 RX ORDER — SODIUM CHLORIDE 0.9 % (FLUSH) 0.9 %
5-40 SYRINGE (ML) INJECTION EVERY 8 HOURS
Status: DISCONTINUED | OUTPATIENT
Start: 2019-10-04 | End: 2019-10-04 | Stop reason: HOSPADM

## 2019-10-04 RX ORDER — ONDANSETRON 2 MG/ML
INJECTION INTRAMUSCULAR; INTRAVENOUS AS NEEDED
Status: DISCONTINUED | OUTPATIENT
Start: 2019-10-04 | End: 2019-10-04 | Stop reason: HOSPADM

## 2019-10-04 RX ORDER — SODIUM CHLORIDE 0.9 % (FLUSH) 0.9 %
5-40 SYRINGE (ML) INJECTION AS NEEDED
Status: DISCONTINUED | OUTPATIENT
Start: 2019-10-04 | End: 2019-10-04 | Stop reason: HOSPADM

## 2019-10-04 RX ORDER — SODIUM CHLORIDE, SODIUM LACTATE, POTASSIUM CHLORIDE, CALCIUM CHLORIDE 600; 310; 30; 20 MG/100ML; MG/100ML; MG/100ML; MG/100ML
125 INJECTION, SOLUTION INTRAVENOUS CONTINUOUS
Status: DISCONTINUED | OUTPATIENT
Start: 2019-10-04 | End: 2019-10-04 | Stop reason: HOSPADM

## 2019-10-04 RX ORDER — NEOSTIGMINE METHYLSULFATE 1 MG/ML
INJECTION INTRAVENOUS AS NEEDED
Status: DISCONTINUED | OUTPATIENT
Start: 2019-10-04 | End: 2019-10-04 | Stop reason: HOSPADM

## 2019-10-04 RX ORDER — ROCURONIUM BROMIDE 10 MG/ML
INJECTION, SOLUTION INTRAVENOUS AS NEEDED
Status: DISCONTINUED | OUTPATIENT
Start: 2019-10-04 | End: 2019-10-04 | Stop reason: HOSPADM

## 2019-10-04 RX ORDER — HYDROMORPHONE HYDROCHLORIDE 1 MG/ML
0.2 INJECTION, SOLUTION INTRAMUSCULAR; INTRAVENOUS; SUBCUTANEOUS
Status: DISCONTINUED | OUTPATIENT
Start: 2019-10-04 | End: 2019-10-04 | Stop reason: HOSPADM

## 2019-10-04 RX ORDER — GLYCOPYRROLATE 0.2 MG/ML
INJECTION INTRAMUSCULAR; INTRAVENOUS AS NEEDED
Status: DISCONTINUED | OUTPATIENT
Start: 2019-10-04 | End: 2019-10-04 | Stop reason: HOSPADM

## 2019-10-04 RX ORDER — FENTANYL CITRATE 50 UG/ML
INJECTION, SOLUTION INTRAMUSCULAR; INTRAVENOUS AS NEEDED
Status: DISCONTINUED | OUTPATIENT
Start: 2019-10-04 | End: 2019-10-04 | Stop reason: HOSPADM

## 2019-10-04 RX ORDER — MIDAZOLAM HYDROCHLORIDE 1 MG/ML
INJECTION, SOLUTION INTRAMUSCULAR; INTRAVENOUS AS NEEDED
Status: DISCONTINUED | OUTPATIENT
Start: 2019-10-04 | End: 2019-10-04 | Stop reason: HOSPADM

## 2019-10-04 RX ORDER — DIPHENHYDRAMINE HYDROCHLORIDE 50 MG/ML
12.5 INJECTION, SOLUTION INTRAMUSCULAR; INTRAVENOUS AS NEEDED
Status: DISCONTINUED | OUTPATIENT
Start: 2019-10-04 | End: 2019-10-04 | Stop reason: HOSPADM

## 2019-10-04 RX ORDER — ACETAMINOPHEN 325 MG/1
650 TABLET ORAL ONCE
Status: COMPLETED | OUTPATIENT
Start: 2019-10-04 | End: 2019-10-04

## 2019-10-04 RX ORDER — BUPIVACAINE HYDROCHLORIDE 5 MG/ML
INJECTION, SOLUTION EPIDURAL; INTRACAUDAL AS NEEDED
Status: DISCONTINUED | OUTPATIENT
Start: 2019-10-04 | End: 2019-10-04 | Stop reason: HOSPADM

## 2019-10-04 RX ORDER — MORPHINE SULFATE 10 MG/ML
2 INJECTION, SOLUTION INTRAMUSCULAR; INTRAVENOUS
Status: DISCONTINUED | OUTPATIENT
Start: 2019-10-04 | End: 2019-10-04 | Stop reason: HOSPADM

## 2019-10-04 RX ORDER — OXYCODONE AND ACETAMINOPHEN 5; 325 MG/1; MG/1
1 TABLET ORAL
Qty: 15 TAB | Refills: 0 | Status: SHIPPED | OUTPATIENT
Start: 2019-10-04 | End: 2019-10-07

## 2019-10-04 RX ORDER — DEXAMETHASONE SODIUM PHOSPHATE 4 MG/ML
INJECTION, SOLUTION INTRA-ARTICULAR; INTRALESIONAL; INTRAMUSCULAR; INTRAVENOUS; SOFT TISSUE AS NEEDED
Status: DISCONTINUED | OUTPATIENT
Start: 2019-10-04 | End: 2019-10-04 | Stop reason: HOSPADM

## 2019-10-04 RX ORDER — CEFAZOLIN SODIUM 1 G/3ML
INJECTION, POWDER, FOR SOLUTION INTRAMUSCULAR; INTRAVENOUS AS NEEDED
Status: DISCONTINUED | OUTPATIENT
Start: 2019-10-04 | End: 2019-10-04 | Stop reason: HOSPADM

## 2019-10-04 RX ORDER — HYDROMORPHONE HYDROCHLORIDE 2 MG/ML
INJECTION, SOLUTION INTRAMUSCULAR; INTRAVENOUS; SUBCUTANEOUS AS NEEDED
Status: DISCONTINUED | OUTPATIENT
Start: 2019-10-04 | End: 2019-10-04 | Stop reason: HOSPADM

## 2019-10-04 RX ORDER — LIDOCAINE HYDROCHLORIDE 20 MG/ML
INJECTION, SOLUTION EPIDURAL; INFILTRATION; INTRACAUDAL; PERINEURAL AS NEEDED
Status: DISCONTINUED | OUTPATIENT
Start: 2019-10-04 | End: 2019-10-04 | Stop reason: HOSPADM

## 2019-10-04 RX ORDER — FENTANYL CITRATE 50 UG/ML
25 INJECTION, SOLUTION INTRAMUSCULAR; INTRAVENOUS
Status: DISCONTINUED | OUTPATIENT
Start: 2019-10-04 | End: 2019-10-04 | Stop reason: HOSPADM

## 2019-10-04 RX ORDER — ONDANSETRON 2 MG/ML
4 INJECTION INTRAMUSCULAR; INTRAVENOUS AS NEEDED
Status: DISCONTINUED | OUTPATIENT
Start: 2019-10-04 | End: 2019-10-04 | Stop reason: HOSPADM

## 2019-10-04 RX ORDER — SODIUM CHLORIDE 9 MG/ML
25 INJECTION, SOLUTION INTRAVENOUS CONTINUOUS
Status: DISCONTINUED | OUTPATIENT
Start: 2019-10-04 | End: 2019-10-04 | Stop reason: HOSPADM

## 2019-10-04 RX ORDER — PROPOFOL 10 MG/ML
INJECTION, EMULSION INTRAVENOUS AS NEEDED
Status: DISCONTINUED | OUTPATIENT
Start: 2019-10-04 | End: 2019-10-04 | Stop reason: HOSPADM

## 2019-10-04 RX ORDER — OXYCODONE AND ACETAMINOPHEN 5; 325 MG/1; MG/1
1 TABLET ORAL AS NEEDED
Status: DISCONTINUED | OUTPATIENT
Start: 2019-10-04 | End: 2019-10-04 | Stop reason: HOSPADM

## 2019-10-04 RX ORDER — MIDAZOLAM HYDROCHLORIDE 1 MG/ML
0.5 INJECTION, SOLUTION INTRAMUSCULAR; INTRAVENOUS
Status: DISCONTINUED | OUTPATIENT
Start: 2019-10-04 | End: 2019-10-04 | Stop reason: HOSPADM

## 2019-10-04 RX ORDER — FENTANYL CITRATE 50 UG/ML
50 INJECTION, SOLUTION INTRAMUSCULAR; INTRAVENOUS AS NEEDED
Status: DISCONTINUED | OUTPATIENT
Start: 2019-10-04 | End: 2019-10-04 | Stop reason: HOSPADM

## 2019-10-04 RX ORDER — MIDAZOLAM HYDROCHLORIDE 1 MG/ML
1 INJECTION, SOLUTION INTRAMUSCULAR; INTRAVENOUS AS NEEDED
Status: DISCONTINUED | OUTPATIENT
Start: 2019-10-04 | End: 2019-10-04 | Stop reason: HOSPADM

## 2019-10-04 RX ADMIN — GLYCOPYRROLATE 0.4 MG: 0.2 INJECTION, SOLUTION INTRAMUSCULAR; INTRAVENOUS at 10:49

## 2019-10-04 RX ADMIN — HYDROMORPHONE HYDROCHLORIDE 1 MG: 2 INJECTION, SOLUTION INTRAMUSCULAR; INTRAVENOUS; SUBCUTANEOUS at 09:58

## 2019-10-04 RX ADMIN — LIDOCAINE HYDROCHLORIDE 100 MG: 20 INJECTION, SOLUTION EPIDURAL; INFILTRATION; INTRACAUDAL; PERINEURAL at 09:24

## 2019-10-04 RX ADMIN — DEXMEDETOMIDINE HYDROCHLORIDE 10 MCG: 100 INJECTION, SOLUTION, CONCENTRATE INTRAVENOUS at 09:18

## 2019-10-04 RX ADMIN — FENTANYL CITRATE 100 MCG: 50 INJECTION, SOLUTION INTRAMUSCULAR; INTRAVENOUS at 09:24

## 2019-10-04 RX ADMIN — FENTANYL CITRATE 25 MCG: 50 INJECTION INTRAMUSCULAR; INTRAVENOUS at 12:09

## 2019-10-04 RX ADMIN — FENTANYL CITRATE 25 MCG: 50 INJECTION INTRAMUSCULAR; INTRAVENOUS at 12:17

## 2019-10-04 RX ADMIN — ACETAMINOPHEN 650 MG: 325 TABLET ORAL at 09:15

## 2019-10-04 RX ADMIN — MIDAZOLAM 2 MG: 1 INJECTION INTRAMUSCULAR; INTRAVENOUS at 09:18

## 2019-10-04 RX ADMIN — ROCURONIUM BROMIDE 40 MG: 10 SOLUTION INTRAVENOUS at 09:24

## 2019-10-04 RX ADMIN — SODIUM CHLORIDE, SODIUM LACTATE, POTASSIUM CHLORIDE, AND CALCIUM CHLORIDE 125 ML/HR: 600; 310; 30; 20 INJECTION, SOLUTION INTRAVENOUS at 09:11

## 2019-10-04 RX ADMIN — ONDANSETRON HYDROCHLORIDE 4 MG: 2 INJECTION, SOLUTION INTRAMUSCULAR; INTRAVENOUS at 10:49

## 2019-10-04 RX ADMIN — CEFAZOLIN 2 G: 330 INJECTION, POWDER, FOR SOLUTION INTRAMUSCULAR; INTRAVENOUS at 09:50

## 2019-10-04 RX ADMIN — PROPOFOL 200 MG: 10 INJECTION, EMULSION INTRAVENOUS at 09:24

## 2019-10-04 RX ADMIN — NEOSTIGMINE METHYLSULFATE 3 MG: 1 INJECTION, SOLUTION INTRAMUSCULAR; INTRAVENOUS; SUBCUTANEOUS at 10:49

## 2019-10-04 RX ADMIN — DEXMEDETOMIDINE HYDROCHLORIDE 10 MCG: 100 INJECTION, SOLUTION, CONCENTRATE INTRAVENOUS at 09:41

## 2019-10-04 RX ADMIN — DEXAMETHASONE SODIUM PHOSPHATE 8 MG: 4 INJECTION, SOLUTION INTRAMUSCULAR; INTRAVENOUS at 09:37

## 2019-10-04 NOTE — ANESTHESIA POSTPROCEDURE EVALUATION
Procedure(s):  LAPAROSCOPIC bilateral SALPINGO-OOPHORECTOMY. general    Anesthesia Post Evaluation        Patient location during evaluation: PACU  Patient participation: complete - patient participated  Level of consciousness: awake  Pain management: adequate  Airway patency: patent  Anesthetic complications: no  Cardiovascular status: hemodynamically stable  Respiratory status: acceptable  Hydration status: acceptable  Comments: I have seen and evaluated the patient. The patient is ready for PACU discharge. [address], DO                         Vitals Value Taken Time   /73 10/4/2019 12:15 PM   Temp 36.7 °C (98.1 °F) 10/4/2019 11:04 AM   Pulse 62 10/4/2019 12:17 PM   Resp 15 10/4/2019 12:17 PM   SpO2 100 % 10/4/2019 12:17 PM   Vitals shown include unvalidated device data.

## 2019-10-04 NOTE — BRIEF OP NOTE
BRIEF OPERATIVE NOTE    Date of Procedure: 10/4/2019   Preoperative Diagnosis: BRCA POSITIVE  Postoperative Diagnosis: BRCA POSITIVE    Procedure(s):  LAPAROSCOPIC bilateral SALPINGO-OOPHORECTOMY  Surgeon(s) and Role:     Kulwant Adams MD - Primary         Surgical Staff:  Circ-1: Fabricio Gibbs RN  Circ-2: Gen Gu RN  Circ-Relief: Trinity Lopez RN  Scrub Tech-1: Lorin Salas Asst-1: Johanne Talbert  Float Staff: Millie Burgess RN  Event Time In Time Out   Incision Start 7023    Incision Close       Anesthesia: General   Estimated Blood Loss: 10 cc  Specimens:   ID Type Source Tests Collected by Time Destination   1 : Right tube and ovary Fresh Ovary  Kwasi Palacios MD 10/4/2019 1013 Pathology   2 : Left tube and ovary Frozen Section Fallopian Luis Levin MD 10/4/2019 1014 Pathology   1 : Pelvic washing Fresh Pelvis  Kwasi Palacios MD 10/4/2019 1004 Cytology      Findings: Enlarged left ovary. Normal right ovary. Normal uterus. Frozen section pathology of left ovary benign.    Complications: None  Implants: * No implants in log *    Crystal Christian MD

## 2019-10-04 NOTE — DISCHARGE INSTRUCTIONS
Patient Education        Oophorectomy: What to Expect at Home  Your Recovery    After surgery to remove one or both ovaries, you can expect to feel better and stronger each day, although you may need pain medicine for a week or two. You may get tired easily or have less energy than usual. This may last for several weeks after surgery. You will probably notice that your belly is swollen and puffy. This is common. The swelling will take several weeks to go down. You may take 4 to 6 weeks to fully recover. It is important to avoid lifting while you are recovering so that you can heal.  This care sheet gives you a general idea about how long it will take for you to recover. But each person recovers at a different pace. Follow the steps below to get better as quickly as possible. How can you care for yourself at home? Activity    · Rest when you feel tired. Getting enough sleep will help you recover.     · Try to walk each day. Start by walking a little more than you did the day before. Bit by bit, increase the amount you walk. Walking boosts blood flow and helps prevent pneumonia and constipation.     · Avoid strenuous activities, such as bicycle riding, jogging, weight lifting, or aerobic exercise, until your doctor says it is okay.     · Avoid lifting anything that would make you strain. This may include heavy grocery bags and milk containers, a heavy briefcase or backpack, cat litter or dog food bags, a vacuum , or a child.     · Hold a pillow over your incisions when you cough or take deep breaths. This will support your belly and decrease your pain. Do breathing exercises at home as instructed by your doctor. This will help prevent pneumonia.     · You will probably need to take 2 to 4 weeks off from work. It depends on the type of work you do and how you feel.     · Your doctor will tell you when you can have sex again. Diet    · You can eat your normal diet.  If your stomach is upset, try bland, low-fat foods like plain rice, broiled chicken, toast, and yogurt.     · Drink plenty of fluids (unless your doctor tells you not to).     · You may notice that your bowel movements are not regular right after your surgery. This is common. Try to avoid constipation and straining with bowel movements. You may want to take a fiber supplement every day. If you have not had a bowel movement after a couple of days, ask your doctor about taking a mild laxative. Medicines    · Your doctor will tell you if and when you can restart your medicines. He or she will also give you instructions about taking any new medicines.     · If you take blood thinners, such as warfarin (Coumadin), clopidogrel (Plavix), or aspirin, be sure to talk to your doctor. He or she will tell you if and when to start taking those medicines again. Make sure that you understand exactly what your doctor wants you to do.     · Take pain medicines exactly as directed. ? If the doctor gave you a prescription medicine for pain, take it as prescribed. ? If you are not taking a prescription pain medicine, take an over-the-counter medicine such as acetaminophen (Tylenol), ibuprofen (Advil, Motrin), or naproxen (Aleve). Read and follow all instructions on the label. ? Do not take two or more pain medicines at the same time unless the doctor told you to. Many pain medicines contain acetaminophen, which is Tylenol. Too much acetaminophen (Tylenol) can be harmful.     · If you think your pain medicine is making you sick to your stomach:  ? Take your medicine after meals (unless your doctor tells you not to). ? Ask your doctor for a different pain medicine. Incision care    · If you have strips of tape on the cut (incision) the doctor made, leave the tape on for a week or until it falls off. Or follow your doctor's instructions for removing the tape.     · Wash the area daily with warm, soapy water, and pat it dry.  Don't use hydrogen peroxide or alcohol, which can slow healing. You may cover the area with a gauze bandage if it weeps or rubs against clothing. Change the bandage every day.     · Keep the area clean and dry. Other instructions    · Wear loose, comfortable clothing and avoid anything that puts pressure on your belly, such as a girdle, for a few weeks.     · You may want to use a heating pad on your belly to help with pain. Follow-up care is a key part of your treatment and safety. Be sure to make and go to all appointments, and call your doctor if you are having problems. It's also a good idea to know your test results and keep a list of the medicines you take. When should you call for help? Call 911 anytime you think you may need emergency care. For example, call if:    · You passed out (lost consciousness).     · You have chest pain, are short of breath, or cough up blood.    Call your doctor now or seek immediate medical care if:    · You have pain that does not get better after you take pain medicine.     · You cannot pass stools or gas.     · You have vaginal discharge that has increased in amount or smells bad.     · You are sick to your stomach or cannot drink fluids.     · You have loose stitches, or your incision comes open.     · Bright red blood has soaked through the bandage over your incision.     · You have signs of infection, such as:  ? Increased pain, swelling, warmth, or redness. ? Red streaks leading from the incision. ? Pus draining from the incision. ? A fever.     · You have bright red vaginal bleeding that soaks one or more pads in an hour, or you have large clots.     · You have signs of a blood clot in your leg (called a deep vein thrombosis), such as:  ? Pain in your calf, back of the knee, thigh, or groin. ? Redness and swelling in your leg.    Watch closely for changes in your health, and be sure to contact your doctor if you have any problems. Where can you learn more?   Go to http://jose carlos-jeanie.info/. Enter [id number] in the search box to learn more about \"Oophorectomy: What to Expect at Home. \"  Current as of: February 19, 2019  Content Version: 12.2  © 7918-0500 [employer]. Care instructions adapted under license by [employer] (which disclaims liability or warranty for this information). If you have questions about a medical condition or this instruction, always ask your healthcare professional. [address] any warranty or liability for your use of this information. ______________________________________________________________________    Anesthesia Discharge Instructions    After general anesthesia or intervenous sedation, for 24 hours or while taking prescription Narcotics:  · Limit your activities  · Do not drive or operate hazardous machinery  · If you have not urinated within 8 hours after discharge, please contact your surgeon on call. · Do not make important personal or business decisions  · Do not drink alcoholic beverages    Report the following to your surgeon:  · Excessive pain, swelling, redness or odor of or around the surgical area  · Temperature over 100.5 degrees  · Nausea and vomiting lasting longer than 4 hours or if unable to take medication  · Any signs of decreased circulation or nerve impairment to extremity:  Change in color, persistent numbness, tingling, coldness or increased pain.   · Any questions

## 2019-10-04 NOTE — OP NOTES
Gynecologic Oncology Operative Report    Leslie Calvillo  10/4/2019    Pre-operative dx:  BRCA 1 positive    Post-operative dx:  BRCA 1 positive    Procedure:  Laparoscopic bilateral salpingooophorectomy    Surgeon:  Natividad Peacock MD    Assistant:  Maribel Dobson SA     Anesthesia:  GETA    EBL:  10 cc    Complications:  None    Implants:  None    Specimens:   ID Type Source Tests Collected by Time Destination   1 : Right tube and ovary Fresh Ovary   Meri Ramirez MD 10/4/2019 1013 Pathology   2 : Left tube and ovary Frozen Section Fallopian Tube Katerin Farmer MD 10/4/2019 1014 Pathology   1 : Pelvic washing Fresh Pelvis   Meri Ramirez MD 10/4/2019 1004 Cytology     Operative indications:  51 yo WF with diagnosis of breast cancer who was found to be BRCA 1 positive. She was referred to me for risk reducing oophorectomy due to her risk of ovarian cancer. Operative findings:  Enlarged left ovary. Normal right ovary. Normal uterus. Frozen section pathology of left ovary benign. Procedure in detail:  After the risks, benefits, indications, and alternatives of the procedure were discussed with the patient and informed consent was obtained, the patient was taken to the operating room. She was positively identified, administered general anesthesia, and then placed in the dorsal lithotomy position in [address] [place] [address]. An exam under anesthesia was performed. She was then prepped and draped in the usual fashion. A burger catheter was inserted, followed by a sponge-stick in the vagina to act as a uterine manipulator. We then proceeded with laparoscopy by grasping the umbilicus on either side with Allis clamps. A small incision was made at the base with an #11-blade scalpel. A 5 mm blade-less trocar was then directly inserted, with the camera in position to visualize safe entry. Once proper placement was confirmed, the abdomen was then insufflated with carbon dioxide.   A 5 mm blade-less trocar was inserted in the left lower quadrant, midway between the anterior superior iliac spine and the umbilicus. This trocar was inserted under direct visualization to ensure safe entry. We then exchanged the 5 mm umbilical trocar for a 12 mm trocar. The 5 mm blade-less trocar was relocated to the right lower quadrant, midway between the anterior superior iliac spine and the umbilicus. This trocar was inserted under direct visualization to ensure safe entry. The abdomen and pelvis were then examined, with the above mentioned findings noted. Pelvic washings were obtained as well. The patient was then placed in Trendelenburg tilt and we then proceeded with the bilateral salpingooophorectomy. We began on the left side. The peritoneum between the round ligament and the ovary was grasped and elevated. It was then incised with the Harmonic Scalpel, allowing entry into the retroperitoneal space where the ureter was identified. The ovarian vessels were then coagulated and transected with the Harmonic scalpel. The ovary was then elevated and the fallopian tube and proper ovarian ligament were then coagulated and transected at the level of the uterine cornua. The adnexa was then removed through the 12 mm trocar site. We then moved to the right side. The peritoneum between the round ligament and the ovary was grasped and elevated. It was then incised with the Harmonic Scalpel, allowing entry into the retroperitoneal space where the ureter was identified. The ovarian vessels were then coagulated and transected with the Harmonic scalpel. The ovary was then elevated and the fallopian tube and proper ovarian ligament were then coagulated and transected at the level of the uterine cornua. The adnexa was then removed through the 12 mm trocar site. This trocar site was then closed with a 0 Vicryl suture at the level of the fascia using the Rogers-Sunny closure device.   Excellent reapproximation and hemostasis was noted on both sides. The pelvis was then irrigated. Once satisfied with hemostasis, the gas was then allowed to escape from the abdomen and the trocars were removed. She was then taken out of Trendelenburg tilt. The incision sites were closed with a stitch of 4-0 Monocryl, followed by a layer of Dermabond. The patient was taken out of stirrups, awakened from anesthesia, and taken to the recovery room in stable condition. All instrument, sponge, and needle counts were correct.         Raúl Ramires MD  10/4/2019  10:49 AM

## 2019-10-29 ENCOUNTER — OFFICE VISIT (OUTPATIENT)
Dept: GYNECOLOGY | Age: 49
End: 2019-10-29

## 2019-10-29 VITALS
HEIGHT: 72 IN | BODY MASS INDEX: 33.05 KG/M2 | HEART RATE: 96 BPM | DIASTOLIC BLOOD PRESSURE: 87 MMHG | WEIGHT: 244 LBS | SYSTOLIC BLOOD PRESSURE: 147 MMHG

## 2019-10-29 DIAGNOSIS — Z17.0 MALIGNANT NEOPLASM OF UPPER-OUTER QUADRANT OF LEFT BREAST IN FEMALE, ESTROGEN RECEPTOR POSITIVE (HCC): ICD-10-CM

## 2019-10-29 DIAGNOSIS — Z15.01 BRCA1 POSITIVE: Primary | ICD-10-CM

## 2019-10-29 DIAGNOSIS — Z15.09 BRCA1 POSITIVE: Primary | ICD-10-CM

## 2019-10-29 DIAGNOSIS — C50.412 MALIGNANT NEOPLASM OF UPPER-OUTER QUADRANT OF LEFT BREAST IN FEMALE, ESTROGEN RECEPTOR POSITIVE (HCC): ICD-10-CM

## 2019-10-29 NOTE — PROGRESS NOTES
[facility], [address] [place] [address]  P [phone]  F [phone]    Postoperative Office Note  Patient ID:  Name: Nancy Ceja  MRM: [id number]  : 1970/48 y.o. Date: 10/29/2019    Diagnosis:     ICD-10-CM ICD-9-CM    1. BRCA1 positive Z15.01 V84.01     Z15.09     2. Malignant neoplasm of upper-outer quadrant of left breast in female, estrogen receptor positive ([facility] C50.412 174.4     Z17.0 V86.0        Problem List:   Patient Active Problem List   Diagnosis Code    Breast cancer, left ([facility] C50.912    BRCA1 positive Z15.01, Z15.09       SUBJECTIVE:    Nancy Ceja is a 50 y.o. female who presents for followup postoperative care following laparoscopic BSO. Operative findings:  Enlarged left ovary. Normal right ovary. Normal uterus. Frozen section pathology of left ovary benign. The patient's pathology revealed: FINAL PATHOLOGIC DIAGNOSIS   1. Left tube and ovary, salpingo-oophorectomy:   Benign hemorrhagic corpus luteal cyst   Benign fallopian tube with benign paratubal cyst   2. Right tube and ovary, salpingo-oophorectomy:   Benign ovary, no histopathologic changes   Benign fallopian tube, no histopathologic changes       Currently she has no problems with eating, bowel movements, or voiding. Appetite is good. Eating a regular diet without difficulty. Bowel movements are regular. The patient is not having any pain. Medications:     Current Outpatient Medications on File Prior to Visit   Medication Sig Dispense Refill    cholecalciferol, vitamin D3, (VITAMIN D3) 2,000 unit tab Take  by mouth daily.  ibuprofen (ADVIL) 200 mg tablet Take  by mouth every six (6) hours as needed for Pain.  anastrozole (ARIMIDEX) 1 mg tablet Take 1 mg by mouth daily. 90 Tab 3    DOXYCYCLINE HYCLATE PO Take 100 mg by mouth daily. ROSEACEA       No current facility-administered medications on file prior to visit. Allergies: Allergies   Allergen Reactions    Amoxicillin Nausea Only    Augmentin [Amoxicillin-Pot Clavulanate] Hives    Penicillins Hives     Pt reports \"I reacted as a child with hives and was told never to take it again\"  \"All- cillins\"     Past Medical History:   Diagnosis Date    BRCA1-associated protein-1 tumor predisposition syndrome     Cancer ([facility]     BREAST, LEFT    DCIS (ductal carcinoma in situ) of breast 2019    left    Heart murmur     at birth    Ill-defined condition 03/2019    hairline fracture, left knee    Precancerous skin lesion 03/2019    mid back    Rosacea      Past Surgical History:   Procedure Laterality Date    HX BREAST LUMPECTOMY Left 2/20/2019    LEFT BREAST LUMPECTOMY WITH NEEDLE LOCALIZATION, LEFT BREAST SENTINEL NODE BIOPSY WITH BLUE DYE performed by Karina Robles MD at [facility]    HX BREAST RECONSTRUCTION Bilateral 6/17/2019    IMMEDIATE BILATERAL BREAST RECONSTRUCTION WITH SILICONE GEL IMPLANTS AND ALLODERM GRAFTS performed by Hiram Watson MD at [facility]    dental procedure    HX MASTECTOMY Bilateral 6/17/2019    BILATERAL BREAST MASTECTOMIES performed by Karina Robles MD at hospitals AMBULATORY OR    HX WISDOM TEETH EXTRACTION  1990     Social History     Socioeconomic History    Marital status:      Spouse name: Not on file    Number of children: Not on file    Years of education: Not on file    Highest education level: Not on file   Occupational History    Not on file   Social Needs    Financial resource strain: Not on file    Food insecurity:     Worry: Not on file     Inability: Not on file    Transportation needs:     Medical: Not on file     Non-medical: Not on file   Tobacco Use    Smoking status: Never Smoker    Smokeless tobacco: Never Used   Substance and Sexual Activity    Alcohol use:  Yes     Alcohol/week: 2.0 standard drinks     Types: 1 Glasses of wine, 1 Cans of beer per week Drinks per session: 1 or 2     Comment: OCCAS.  Drug use: No    Sexual activity: Not Currently   Lifestyle    Physical activity:     Days per week: Not on file     Minutes per session: Not on file    Stress: Not on file   Relationships    Social connections:     Talks on phone: Not on file     Gets together: Not on file     Attends [employer] service: Not on file     Active member of club or organization: Not on file     Attends meetings of clubs or organizations: Not on file     Relationship status: Not on file    Intimate partner violence:     Fear of current or ex partner: Not on file     Emotionally abused: Not on file     Physically abused: Not on file     Forced sexual activity: Not on file   Other Topics Concern    Not on file   Social History Narrative    Not on file       OBJECTIVE:    Vitals:   Vitals:    10/29/19 1448   BP: 147/87   Pulse: 96   Weight: 244 lb (110.7 kg)   Height: 6' 0.99\" (1.854 m)     Physical Examination:     General:  alert, cooperative, no distress   Lungs: lungs clear to auscultation   Cardiac: Regular rate and rhythm   Abdomen: soft, bowel sounds active, non-tender  No CVA tenderness   Incision: healing well   Pelvic: External genitalia: normal general appearance  Urinary system: urethral meatus normal  Vaginal: normal without tenderness, induration or masses  Cervix: normal appearance  Adnexa: removed surgically  Uterus: normal single, nontender   Rectal: not done   Extremity:   extremities normal, atraumatic, no cyanosis or edema     IMPRESSION:    Maritza Bernal is doing well postoperatively. She has a diagnosis of a BRCA 1 mutation. She had no evidence of malignancy at the time of her surgery. Medical problems:     Patient Active Problem List   Diagnosis Code    [facility] ([facility] C50.912    BRCA1 positive Z15.01, Z15.09       PLAN:    The operative procedures and clinical results have been reviewed with the patient.   Implications of diagnosis discussed at length. All questions answered. The patient may return to work. I will see the patient back prn. The patient is advised to call our office with any problems or concerns.     Genoveva Cortez MD  10/29/2019/12:02 PM

## 2019-10-29 NOTE — LETTER
10/29/19 Patient: Saba Prado YOB: 1970 Date of Visit: 10/29/2019 Fam Wilburn NP 
[address] [address] VIA Facsimile: [phone] Dear Fam Wilburn NP, Thank you for referring Ms. Robert Rodriguez to [facility] for evaluation. My notes for this consultation are attached. If you have questions, please do not hesitate to call me. I look forward to following your patient along with you.  
 
 
Sincerely, 
 
Dillon Vincent MD

## 2019-12-06 ENCOUNTER — HOSPITAL ENCOUNTER (OUTPATIENT)
Dept: MAMMOGRAPHY | Age: 49
Discharge: HOME | End: 2019-12-06
Attending: INTERNAL MEDICINE
Payer: COMMERCIAL

## 2019-12-06 DIAGNOSIS — Z78.0 POSTMENOPAUSAL: ICD-10-CM

## 2019-12-06 DIAGNOSIS — Z17.0 MALIGNANT NEOPLASM OF UPPER-OUTER QUADRANT OF LEFT BREAST IN FEMALE, ESTROGEN RECEPTOR POSITIVE (HCC): ICD-10-CM

## 2019-12-06 DIAGNOSIS — C50.412 MALIGNANT NEOPLASM OF UPPER-OUTER QUADRANT OF LEFT BREAST IN FEMALE, ESTROGEN RECEPTOR POSITIVE (HCC): ICD-10-CM

## 2019-12-06 PROCEDURE — 77080 DXA BONE DENSITY AXIAL: CPT

## 2019-12-10 ENCOUNTER — TELEPHONE (OUTPATIENT)
Dept: ONCOLOGY | Age: 49
End: 2019-12-10

## 2019-12-10 NOTE — TELEPHONE ENCOUNTER
I called the Patient and left her a message to call me back, as she did not answer the phone. Waiting for a call back.        ----- Message from Karla Lucero RN sent at 12/10/2019 10:55 AM EST -----    ----- Message -----  From: Alysha Santiago MD  Sent: 12/9/2019  12:47 PM EST  To: Karla Lucero RN    Please let her know this is normal, thanks

## 2019-12-10 NOTE — TELEPHONE ENCOUNTER
Patient returned my phone call and I let her know that her BMD was Normal.       ----- Message from Cecilia Grijalva RN sent at 12/10/2019 10:55 AM EST -----    ----- Message -----  From: Moses Smith MD  Sent: 12/9/2019  12:47 PM EST  To: Cecilia Grijalva RN    Please let her know this is normal, thanks

## 2019-12-21 NOTE — PROGRESS NOTES
The patient's genetic testing was sent to [facility] via [facility] [place] [address] with confirmation number [id number]. 25-Sep-2019

## 2020-01-29 ENCOUNTER — OFFICE VISIT (OUTPATIENT)
Dept: ONCOLOGY | Age: 50
End: 2020-01-29

## 2020-01-29 VITALS
HEIGHT: 72 IN | OXYGEN SATURATION: 92 % | HEART RATE: 92 BPM | RESPIRATION RATE: 16 BRPM | SYSTOLIC BLOOD PRESSURE: 137 MMHG | WEIGHT: 249.2 LBS | BODY MASS INDEX: 33.75 KG/M2 | DIASTOLIC BLOOD PRESSURE: 82 MMHG | TEMPERATURE: 97.2 F

## 2020-01-29 DIAGNOSIS — Z78.0 POSTMENOPAUSAL: ICD-10-CM

## 2020-01-29 DIAGNOSIS — Z15.01 BRCA1 GENE MUTATION POSITIVE: ICD-10-CM

## 2020-01-29 DIAGNOSIS — Z15.09 BRCA1 GENE MUTATION POSITIVE: ICD-10-CM

## 2020-01-29 DIAGNOSIS — T45.1X5A HOT FLASHES RELATED TO AROMATASE INHIBITOR THERAPY: ICD-10-CM

## 2020-01-29 DIAGNOSIS — M25.50 ARTHRALGIA, UNSPECIFIED JOINT: ICD-10-CM

## 2020-01-29 DIAGNOSIS — Z17.0 MALIGNANT NEOPLASM OF UPPER-OUTER QUADRANT OF LEFT BREAST IN FEMALE, ESTROGEN RECEPTOR POSITIVE (HCC): Primary | ICD-10-CM

## 2020-01-29 DIAGNOSIS — C50.412 MALIGNANT NEOPLASM OF UPPER-OUTER QUADRANT OF LEFT BREAST IN FEMALE, ESTROGEN RECEPTOR POSITIVE (HCC): Primary | ICD-10-CM

## 2020-01-29 DIAGNOSIS — R23.2 HOT FLASHES RELATED TO AROMATASE INHIBITOR THERAPY: ICD-10-CM

## 2020-01-29 NOTE — PROGRESS NOTES
Roberto Guerrero is a 52 y.o. female Follow up for the Evaluation for Breast Cancer. 1. Have you been to the ER, urgent care clinic since your last visit? Hospitalized since your last visit? No    2. Have you seen or consulted any other health care providers outside of the [address] [place] [address] since your last visit? Include any pap smears or colon screening.  No

## 2020-01-29 NOTE — PROGRESS NOTES
[facility] [place] at [facility]  [address] [facility]  Felice Ma: [phone]  F: [phone]      Reason for Visit:   Gab Dalton is a 52 y.o. female who is seen in consultation at the request of Dr. Papo Mak for evaluation of therapy for breast cancer    Consulting physician:  Dr. Libertad Patel    Treatment History:   · 12/31/18 left breast bx:  IDC, gr 2, 3 mm, ER + at 100%, OK + at 70%, HER 2 negative at [facility] 0; DCIS ER + at 100%, OK + at 70%; another site biopsied with [facility]  · 2/20/19 L breast lumpectomy:  IDC, gr 1, 3 mm, DCIS present with EIC, papillary, cribriform; medial and posterior margins + for DCIS, 0/4 LN involved; pT1a pN0 cM0  · 6/17/19 bilateral mastectomy: right breast: benign, left breast: residual DCIS, gr 2 with focal necrosis, 12 mm, fibrocystic changes including columnar alteration, usual ductal hyperplasia  · BRCA 1 pathologic mutation  · Tamoxifen 7/2019-9/30/19  · Anastrozole 10/2019-    History of Present Illness: An abnormal mammogram led to the above pathology. FH:  Sister with breast cancer at age 40, BRCA 3 +; father BRCA 1 +; PGM with uterine cancer    Interval history: In for follow up. Complains of gr 2 fatigue, gr 1 chills, gr 2 insomnia, gr 1 concentration, gr 2 pain 6/10 to joints, gr 2 neuropathy.     LMP: 4/27/19    Past Medical History:   Diagnosis Date    BRCA1-associated protein-1 tumor predisposition syndrome     Cancer ([facility] [address]     BREAST, LEFT    DCIS (ductal carcinoma in situ) of breast 2019    left    Heart murmur     at birth   [facility] Ill-defined condition 03/2019    hairline fracture, left knee    Precancerous skin lesion 03/2019    mid back    Rosacea       Past Surgical History:   Procedure Laterality Date    HX BREAST LUMPECTOMY Left 2/20/2019    LEFT BREAST LUMPECTOMY WITH NEEDLE LOCALIZATION, LEFT BREAST SENTINEL NODE BIOPSY WITH BLUE DYE performed by Juan Antonio Bergeron MD at [address] [facility] RECONSTRUCTION Bilateral 6/17/2019    IMMEDIATE BILATERAL BREAST RECONSTRUCTION WITH SILICONE GEL IMPLANTS AND ALLODERM GRAFTS performed by Lionel Peace MD at [facility]    dental procedure    HX MASTECTOMY Bilateral 6/17/2019    BILATERAL BREAST MASTECTOMIES performed by Roro Calvillo MD at [place] [address] HX [address] [facility] EXTRACTION  1990      Social History     Tobacco Use    Smoking status: Never Smoker    Smokeless tobacco: Never Used   Substance Use Topics    Alcohol use: Yes     Alcohol/week: 2.0 standard drinks     Types: 1 Glasses of wine, 1 Cans of beer per week     Drinks per session: 1 or 2     Comment: OCCAS. Family History   Problem Relation Age of Onset    Breast Cancer Sister         mid 35s   [facility] Post-op Nausea/Vomiting Sister     Cancer Mother         thyroid    Hypertension Mother     Post-op Nausea/Vomiting Mother     Heart Disease Father         cabg 3 VESSEL     Current Outpatient Medications   Medication Sig    OTHER     cholecalciferol, vitamin D3, (VITAMIN D3) 2,000 unit tab Take  by mouth daily.  ibuprofen (ADVIL) 200 mg tablet Take  by mouth every six (6) hours as needed for Pain.  anastrozole (ARIMIDEX) 1 mg tablet Take 1 mg by mouth daily.  DOXYCYCLINE HYCLATE PO Take 100 mg by mouth daily. ROSEACEA     No current facility-administered medications for this visit. Allergies   Allergen Reactions    Amoxicillin Nausea Only    Augmentin [Amoxicillin-Pot Clavulanate] Hives    Penicillins Hives     Pt reports \"I reacted as a child with hives and was told never to take it again\"  \"All- cillins\"        Review of Systems: A complete review of systems was obtained, negative except as described above.     Physical Exam:     Visit Vitals  /82 (BP 1 Location: Left arm, BP Patient Position: Sitting)   Pulse 92   Temp 97.2 °F (36.2 °C) (Temporal)   Resp 16   Ht 6' (1.829 m)   Wt 249 lb 3.2 oz (113 kg)   SpO2 92%   BMI 33.80 kg/m²     ECOG PS: 0  General: No distress  Eyes: PERRLA, anicteric sclerae  HENT: Atraumatic, OP clear  Neck: Supple  Lymphatic: No cervical, supraclavicular adenopathy  Respiratory: CTAB, normal respiratory effort  CV: Normal rate, regular rhythm, no murmurs, no peripheral edema  GI: Soft, nontender, nondistended, no masses, no hepatomegaly, no splenomegaly; + BS  MS:  Digits without clubbing or cyanosis. Skin: No rashes, ecchymoses, or petechiae. Normal temperature, turgor, and texture. Psych: Alert, oriented, appropriate affect, normal judgment/insight    Results:     Lab Results   Component Value Date/Time    WBC 4.5 09/30/2019 09:42 AM    HGB 13.0 09/30/2019 09:42 AM    HCT 41.1 09/30/2019 09:42 AM    PLATELET 411 40/36/1369 09:42 AM    MCV 96.5 09/30/2019 09:42 AM    ABS. NEUTROPHILS 2.9 09/30/2019 09:42 AM     Lab Results   Component Value Date/Time    Sodium 142 09/30/2019 09:42 AM    Potassium 4.1 09/30/2019 09:42 AM    Chloride 111 (H) 09/30/2019 09:42 AM    CO2 26 09/30/2019 09:42 AM    Glucose 122 (H) 09/30/2019 09:42 AM    BUN 9 09/30/2019 09:42 AM    Creatinine 0.67 09/30/2019 09:42 AM    GFR est AA >60 09/30/2019 09:42 AM    GFR est non-AA >60 09/30/2019 09:42 AM    Calcium 9.2 09/30/2019 09:42 AM     Lab Results   Component Value Date/Time    Bilirubin, total 0.3 09/30/2019 09:42 AM    ALT (SGPT) 22 09/30/2019 09:42 AM    AST (SGOT) 14 (L) 09/30/2019 09:42 AM    Alk. phosphatase 62 09/30/2019 09:42 AM    Protein, total 6.8 09/30/2019 09:42 AM    Albumin 3.6 09/30/2019 09:42 AM    Globulin 3.2 09/30/2019 09:42 AM         Records reviewed and summarized above. Pathology report(s) reviewed above. Assessment/plan:   1. Left UOQ IDC, 3 mm, gr 1, 0/4 LN, ER +, TN +, HER 2 negative:  Stage IA (both anatomic and prognostic)    We explained to the patient that the goal of systemic adjuvant therapy is to improve the chances for cure and decrease the risk of relapse.  We explained why a patient can have microscopic cancer spread now even though physical examination, laboratory studies and imaging studies are negative for cancer. We explained that the same treatments used now as adjuvant or preventive treatments rarely if ever are curative in women who develop metastases. With her T1a disease, chemotherapy is not warranted. bilateral mastectomies, no indication for XRT. Was having joint pains on tamoxifen that wereinterfering with her QOL. Changed to anastrozole after her ovaries were removed. DEXA on 12/6/19 normal. Taking anastrozole 1 mg daily, tolerating well. 2. Emotional well being:  She has excellent support and is coping well with her disease    3. Pathogenic mutation BRCA1:  S.; bilateral mastectomies in 6/2019 and bilateral oophorectomies on 10/4/19. 4. Joint pain:  Better on anastrozole. Reports stiffness and pain in the morning, evenings, as well after sitting for long periods of time. Taking osteoflex and going to the gym. Only using advil PRN at night    5. Hot flashes: due to AI, mainly at night. Tolerable at this time, she will let us know if this worsens. I appreciate the opportunity to participate in Ms. Blaine Archibald's care. Signed By: Naima Hammer MD      No orders of the defined types were placed in this encounter.

## 2020-03-10 ENCOUNTER — TELEPHONE (OUTPATIENT)
Dept: ONCOLOGY | Age: 50
End: 2020-03-10

## 2020-03-10 NOTE — TELEPHONE ENCOUNTER
Patient called having some side affects from the anastrozole  Sh is noticing an increase in joint pain  Hip pain   Knee pain  She is needing some relief---or a med change  She states she is taking Ibprofen and using a heating pad  When sitting long periods of time like driving 30 mins back and forth to work is when she feels the worse or just waking up in the am  She was on a supplement but did stop that back in feb she doesn't know if she should start that back    She also has tingling fingers and all this is affecting her mobility    Thanks

## 2020-03-10 NOTE — TELEPHONE ENCOUNTER
3/10/2020 12:44 PM: Returned call to patient, verified ID x 2. Advised patient that Trisha White NP, recommends stopping anastrozole for two weeks to see if symptoms improve. Patient was agreeable to this and stated she will call back in two weeks.

## 2020-03-25 ENCOUNTER — TELEPHONE (OUTPATIENT)
Dept: ONCOLOGY | Age: 50
End: 2020-03-25

## 2020-03-25 NOTE — TELEPHONE ENCOUNTER
3/25/2020 6:15 PM: Called patient to see how she is doing after stopping anastrozole for two weeks. Patient stated that about a week after stopping the medication, she started feeling a big difference. Patient stated that the joint pain is \"nonexistant\" now and she has been walking for thirty minutes every day. Also stated that she is sleeping better but this could be because she is not working right now. Patient stated, \"I think the joint pain was worse with anastrozole than it was with tamoxifen. \" Advised patient that Trisha White NP, will be consulted and the patient will receive a call back.

## 2020-03-26 RX ORDER — TAMOXIFEN CITRATE 20 MG/1
20 TABLET ORAL DAILY
Qty: 90 TAB | Refills: 2 | Status: SHIPPED | OUTPATIENT
Start: 2020-03-26 | End: 2021-01-05

## 2020-03-26 NOTE — TELEPHONE ENCOUNTER
I attempted to call the Patient, but she did not answer the phone, so I left her a message stating to call me back along with the phone number.

## 2020-07-21 ENCOUNTER — VIRTUAL VISIT (OUTPATIENT)
Dept: ONCOLOGY | Age: 50
End: 2020-07-21

## 2020-07-21 DIAGNOSIS — Z17.0 MALIGNANT NEOPLASM OF UPPER-OUTER QUADRANT OF LEFT BREAST IN FEMALE, ESTROGEN RECEPTOR POSITIVE (HCC): Primary | ICD-10-CM

## 2020-07-21 DIAGNOSIS — C50.412 MALIGNANT NEOPLASM OF UPPER-OUTER QUADRANT OF LEFT BREAST IN FEMALE, ESTROGEN RECEPTOR POSITIVE (HCC): Primary | ICD-10-CM

## 2020-07-21 RX ORDER — ZINC GLUCONATE 10 MG
LOZENGE ORAL
COMMUNITY

## 2020-07-21 NOTE — PROGRESS NOTES
Cancer [place] at [address], [address] [facility] [facility]  Arielle Mel: [phone]  F: [phone]        Reason for Visit:   Maricruz Maldonado is a 52 y.o. female who is seen by synchronous (real-time) audio-video technology for follow up of breast cancer    Breast surgeon:  Dr. Ezequiel Alex  Consulting physician:  Dr. Anabell Rodas    Treatment History:   · 12/31/18 left breast bx:  IDC, gr 2, 3 mm, ER + at 100%, MD + at 70%, HER 2 negative at [facility] 0; DCIS ER + at 100%, MD + at 70%; another site biopsied with [facility]  · 2/20/19 L breast lumpectomy:  IDC, gr 1, 3 mm, DCIS present with EIC, papillary, cribriform; medial and posterior margins + for DCIS, 0/4 LN involved; pT1a pN0 cM0  · 6/17/19 bilateral mastectomy: right breast: benign, left breast: residual DCIS, gr 2 with focal necrosis, 12 mm, fibrocystic changes including columnar alteration, usual ductal hyperplasia  · BRCA 1 pathologic mutation  · Tamoxifen 7/2019-9/30/19  · 10/4/19 BSO, benign  · Anastrozole 10/2019-3/10/20 (stopped for joint pain)  · Tamoxifen 3/2020-    History of Present Illness: An abnormal mammogram led to the above pathology.     FH:  Sister with breast cancer at age 40, BRCA 3 +; father BRCA 1 +; PGM with uterine cancer    Interval history: complains of joint aches in the am. Improved on jc, was worse on anastrozole      Past Medical History:   Diagnosis Date    BRCA1-associated protein-1 tumor predisposition syndrome     Cancer ([address]     BREAST, LEFT    DCIS (ductal carcinoma in situ) of breast 2019    left    Heart murmur     at birth   [facility] Ill-defined condition 03/2019    hairline fracture, left knee    Precancerous skin lesion 03/2019    mid back    Rosacea       Past Surgical History:   Procedure Laterality Date    HX BREAST LUMPECTOMY Left 2/20/2019    LEFT BREAST LUMPECTOMY WITH NEEDLE LOCALIZATION, LEFT BREAST SENTINEL NODE BIOPSY WITH BLUE DYE performed by Marcellus Rodriguez MD at [address] [place] [address]  HX BREAST RECONSTRUCTION Bilateral 6/17/2019    IMMEDIATE BILATERAL BREAST RECONSTRUCTION WITH SILICONE GEL IMPLANTS AND ALLODERM GRAFTS performed by Gil Rai MD at [facility]    dental procedure    HX MASTECTOMY Bilateral 6/17/2019    BILATERAL BREAST MASTECTOMIES performed by Kylah Munoz MD at [address] HX [address] EXTRACTION  1990      Social History     Tobacco Use    Smoking status: Never Smoker    Smokeless tobacco: Never Used   Substance Use Topics    Alcohol use: Yes     Alcohol/week: 2.0 standard drinks     Types: 1 Glasses of wine, 1 Cans of beer per week     Drinks per session: 1 or 2     Comment: OCCAS. Family History   Problem Relation Age of Onset    Breast Cancer Sister         mid 35s   [facility] Post-op Nausea/Vomiting Sister     Cancer Mother         thyroid    Hypertension Mother     Post-op Nausea/Vomiting Mother     Heart Disease Father         cabg 3 VESSEL     Current Outpatient Medications   Medication Sig    OTHER     magnesium 250 mg tab Take  by mouth.  tamoxifen (NOLVADEX) 20 mg tablet Take 1 Tab by mouth daily.  OTHER     cholecalciferol, vitamin D3, (VITAMIN D3) 2,000 unit tab Take  by mouth daily.  ibuprofen (ADVIL) 200 mg tablet Take  by mouth every six (6) hours as needed for Pain.  DOXYCYCLINE HYCLATE PO Take 100 mg by mouth daily. ROSEACEA     No current facility-administered medications for this visit. Allergies   Allergen Reactions    Amoxicillin Nausea Only    Augmentin [Amoxicillin-Pot Clavulanate] Hives    Penicillins Hives     Pt reports \"I reacted as a child with hives and was told never to take it again\"  \"All- cillins\"        Review of Systems: A complete review of systems was obtained, negative except as described above. Physical Exam:     There were no vitals taken for this visit.   ECOG PS: 0  General: alert, cooperative, no distress   Mental  status: normal mood, behavior, speech, dress, motor activity, and thought processes, able to follow commands   HENT: NCAT   Neck: no visualized mass   Resp: no respiratory distress   Neuro: no gross deficits   Skin: no discoloration or lesions of concern on visible areas   Psychiatric: normal affect, consistent with stated mood, no evidence of hallucinations       Due to this being a TeleHealth evaluation (During DQUQM-77 public health emergency), many elements of the physical examination are unable to be assessed. Evaluation of the following organ systems was limited: Vitals/Constitutional/EENT/Resp/CV/GI//MS/Neuro/Skin/Heme-Lymph-Imm. Results:     Lab Results   Component Value Date/Time    WBC 4.5 09/30/2019 09:42 AM    HGB 13.0 09/30/2019 09:42 AM    HCT 41.1 09/30/2019 09:42 AM    PLATELET 752 28/14/2131 09:42 AM    MCV 96.5 09/30/2019 09:42 AM    ABS. NEUTROPHILS 2.9 09/30/2019 09:42 AM     Lab Results   Component Value Date/Time    Sodium 142 09/30/2019 09:42 AM    Potassium 4.1 09/30/2019 09:42 AM    Chloride 111 (H) 09/30/2019 09:42 AM    CO2 26 09/30/2019 09:42 AM    Glucose 122 (H) 09/30/2019 09:42 AM    BUN 9 09/30/2019 09:42 AM    Creatinine 0.67 09/30/2019 09:42 AM    GFR est AA >60 09/30/2019 09:42 AM    GFR est non-AA >60 09/30/2019 09:42 AM    Calcium 9.2 09/30/2019 09:42 AM     Lab Results   Component Value Date/Time    Bilirubin, total 0.3 09/30/2019 09:42 AM    ALT (SGPT) 22 09/30/2019 09:42 AM    Alk. phosphatase 62 09/30/2019 09:42 AM    Protein, total 6.8 09/30/2019 09:42 AM    Albumin 3.6 09/30/2019 09:42 AM    Globulin 3.2 09/30/2019 09:42 AM         Records reviewed and summarized above. Pathology report(s) reviewed above. Assessment/plan:   1. Left UOQ IDC, 3 mm, gr 1, 0/4 LN, ER +, OH +, HER 2 negative:  Stage IA (both anatomic and prognostic)    We explained to the patient that the goal of systemic adjuvant therapy is to improve the chances for cure and decrease the risk of relapse.  We explained why a patient can have microscopic cancer spread now even though physical examination, laboratory studies and imaging studies are negative for cancer. We explained that the same treatments used now as adjuvant or preventive treatments rarely if ever are curative in women who develop metastases. With her T1a disease, chemotherapy is not warranted. bilateral mastectomies, no indication for XRT. DEXA on 12/6/19 normal. Taking tamoxifen 20 mg daily, tolerating well. 2. Emotional well being:  She has excellent support and is coping well with her disease    3. Pathogenic mutation BRCA1:  W.; bilateral mastectomies in 6/2019 and bilateral oophorectomies on 10/4/19. 4. Joint pain:  Better on jc than on anastrozole. Reports stiffness and pain in the morning, evenings, as well after sitting for long periods of time. Taking osteoflex and going to the gym. Only using advil PRN at night. Taking 250 mg magnesium PO daily with improvement    5. Obesity:  Has joined [employer] and is trying to lose weight, has lost 10 lbs    I was in the office while conducting this encounter. The patient was at her home. Consent:  She and/or her healthcare decision maker is aware that this patient-initiated Telehealth encounter is a billable service, with coverage as determined by her insurance carrier. She is aware that she may receive a bill and has provided verbal consent to proceed: Yes    Pursuant to the emergency declaration under the [employer] and the [facility], 1135 waiver authority and the [employer] and [employer] General Act, this Virtual  Visit was conducted, with patient's (and/or legal guardian's) consent, to reduce the patient's risk of exposure to COVID-19 and provide necessary medical care. Services were provided through a video synchronous discussion virtually to substitute for in-person visit.       I appreciate the opportunity to participate in Ms. Luba Archibald's care. Signed By: Lucia Carrera MD      No orders of the defined types were placed in this encounter.

## 2020-09-29 ENCOUNTER — OFFICE VISIT (OUTPATIENT)
Dept: SURGERY | Age: 50
End: 2020-09-29
Payer: COMMERCIAL

## 2020-09-29 VITALS
BODY MASS INDEX: 33.72 KG/M2 | DIASTOLIC BLOOD PRESSURE: 78 MMHG | HEIGHT: 72 IN | SYSTOLIC BLOOD PRESSURE: 123 MMHG | TEMPERATURE: 98.9 F | WEIGHT: 249 LBS | HEART RATE: 84 BPM

## 2020-09-29 DIAGNOSIS — Z85.3 HX OF BREAST CANCER: Primary | ICD-10-CM

## 2020-09-29 PROCEDURE — 99213 OFFICE O/P EST LOW 20 MIN: CPT | Performed by: SURGERY

## 2020-09-29 NOTE — PROGRESS NOTES
HISTORY OF PRESENT ILLNESS  Anabell Branch is a 52 y.o. female. HPI   ESTABLISHED patient here for follow-up of LEFT breast cancer, S/P BILATERAL mastectomy with reconstruction with implants by Dr. Jone Buck. Is doing well other than joint pain on tamoxifen. She tried anastrozole but the pain was worse so she is back on Tamoxifen. Has no breast complaints today. 1/2019 LEFT invasive ductal carcinoma, grade 2, %. NC 70%, Her 2 -  2/2019 LEFT lumpectomy. 3mm IDC grade 1.  0/4 nodes,  3 sites of DCIS. + medial and posterior margin  Tamoxifen, anastrozole, tamoxifen  6/2019 Bilateral mastectomy with silicone implants  87/6461 oophorectomy  BRCA 1 positive    ROS    Physical Exam  Chest:      Breasts: Breasts are symmetrical.         Right: No inverted nipple (reconstructed), mass, skin change or tenderness. Left: No inverted nipple (reconstructed), mass, skin change or tenderness. Comments: Bilateral mastectomy with implants  Nipple reconstruction and tattoo  Lymphadenopathy:      Upper Body:      Right upper body: No supraclavicular or axillary adenopathy. Left upper body: No supraclavicular or axillary adenopathy. ASSESSMENT and PLAN    ICD-10-CM ICD-9-CM    1. Hx of breast cancer  Z85.3 V10.3       1. NAOMI  2. Tolerating tamoxifen  3.  Yearly visit with Vanessa Mcdermott NP  Daughter started at [facility]

## 2020-09-29 NOTE — LETTER
9/30/20 Patient: Ezequiel Kapoor YOB: 1970 Date of Visit: 9/29/2020 Jin Traylor NP 
[address] [facility] [id number] VIA Facsimile: [phone] Dear Jin Traylor NP, Thank you for referring Ms. Jorje Hdz to [facility] for evaluation. My notes for this consultation are attached. If you have questions, please do not hesitate to call me. I look forward to following your patient along with you.  
 
 
Sincerely, 
 
Hansel Jimenez MD

## 2021-01-05 RX ORDER — TAMOXIFEN CITRATE 20 MG/1
TABLET ORAL
Qty: 90 TAB | Refills: 2 | Status: SHIPPED | OUTPATIENT
Start: 2021-01-05 | End: 2021-09-30

## 2021-01-19 ENCOUNTER — TELEPHONE (OUTPATIENT)
Dept: ONCOLOGY | Age: 51
End: 2021-01-19

## 2021-01-20 ENCOUNTER — OFFICE VISIT (OUTPATIENT)
Dept: ONCOLOGY | Age: 51
End: 2021-01-20
Payer: COMMERCIAL

## 2021-01-20 VITALS
WEIGHT: 272 LBS | DIASTOLIC BLOOD PRESSURE: 81 MMHG | BODY MASS INDEX: 36.89 KG/M2 | HEART RATE: 89 BPM | OXYGEN SATURATION: 92 % | RESPIRATION RATE: 18 BRPM | TEMPERATURE: 98.7 F | SYSTOLIC BLOOD PRESSURE: 129 MMHG

## 2021-01-20 DIAGNOSIS — Z15.01 BRCA1 GENE MUTATION POSITIVE: ICD-10-CM

## 2021-01-20 DIAGNOSIS — C50.412 MALIGNANT NEOPLASM OF UPPER-OUTER QUADRANT OF LEFT BREAST IN FEMALE, ESTROGEN RECEPTOR POSITIVE (HCC): Primary | ICD-10-CM

## 2021-01-20 DIAGNOSIS — Z17.0 MALIGNANT NEOPLASM OF UPPER-OUTER QUADRANT OF LEFT BREAST IN FEMALE, ESTROGEN RECEPTOR POSITIVE (HCC): Primary | ICD-10-CM

## 2021-01-20 DIAGNOSIS — Z15.09 BRCA1 GENE MUTATION POSITIVE: ICD-10-CM

## 2021-01-20 DIAGNOSIS — Z78.0 POSTMENOPAUSAL: ICD-10-CM

## 2021-01-20 DIAGNOSIS — M25.50 ARTHRALGIA, UNSPECIFIED JOINT: ICD-10-CM

## 2021-01-20 PROCEDURE — 99213 OFFICE O/P EST LOW 20 MIN: CPT | Performed by: INTERNAL MEDICINE

## 2021-01-20 RX ORDER — VENLAFAXINE HYDROCHLORIDE 75 MG/1
CAPSULE, EXTENDED RELEASE ORAL
COMMUNITY
Start: 2020-12-21

## 2021-01-20 NOTE — PROGRESS NOTES
[facility] [place] at [facility]  [address] [place]: [phone]  F: [phone]        Reason for Visit:   Marley Barrera is a 48 y.o. female who is seen for follow up of breast cancer    Breast surgeon:  Dr. Lars Aguila physician:  Dr. Jennifer Martinez    Treatment History:   · 12/31/18 left breast bx:  IDC, gr 2, 3 mm, ER + at 100%, OR + at 70%, HER 2 negative at [facility] 0; DCIS ER + at 100%, OR + at 70%; another site biopsied with [facility]  · 2/20/19 L breast lumpectomy:  IDC, gr 1, 3 mm, DCIS present with EIC, papillary, cribriform; medial and posterior margins + for DCIS, 0/4 LN involved; pT1a pN0 cM0  · 6/17/19 bilateral mastectomy: right breast: benign, left breast: residual DCIS, gr 2 with focal necrosis, 12 mm, fibrocystic changes including columnar alteration, usual ductal hyperplasia  · BRCA 1 pathologic mutation  · Tamoxifen 7/2019-9/30/19  · 10/4/19 BSO, benign  · Anastrozole 10/2019-3/10/20 (stopped for joint pain)  · Tamoxifen 3/2020-    History of Present Illness: An abnormal mammogram led to the above pathology. FH:  Sister with breast cancer at age 40, BRCA 3 +; father BRCA 1 +; PGM with uterine cancer    Interval history: In today for follow up. Complains of gr 2 hot flashes, gr 1 anxiety, gr 1 urinary frequency.      Last eye exam:  12/2020  Last gyn exam:  12/2020    Past Medical History:   Diagnosis Date    BRCA1-associated protein-1 tumor predisposition syndrome     Cancer ([address]     BREAST, LEFT    DCIS (ductal carcinoma in situ) of breast 2019    left    Heart murmur     at birth    Ill-defined condition 03/2019    hairline fracture, left knee    Precancerous skin lesion 03/2019    mid back    Rosacea       Past Surgical History:   Procedure Laterality Date    HX BREAST LUMPECTOMY Left 2/20/2019    LEFT BREAST LUMPECTOMY WITH NEEDLE LOCALIZATION, LEFT BREAST SENTINEL NODE BIOPSY WITH BLUE DYE performed by Maria E Long MD at [facility]    HX BREAST RECONSTRUCTION Bilateral 6/17/2019    IMMEDIATE BILATERAL BREAST RECONSTRUCTION WITH SILICONE GEL IMPLANTS AND ALLODERM GRAFTS performed by Rafael Farmer MD at [address]X [address] [facility]    dental procedure    HX MASTECTOMY Bilateral 6/17/2019    BILATERAL BREAST MASTECTOMIES performed by Yuridia Becerril MD at [address] EXTRACTION  1990      Social History     Tobacco Use    Smoking status: Never Smoker    Smokeless tobacco: Never Used   Substance Use Topics    Alcohol use: Yes     Alcohol/week: 2.0 standard drinks     Types: 1 Glasses of wine, 1 Cans of beer per week     Drinks per session: 1 or 2     Comment: OCCAS. Family History   Problem Relation Age of Onset    Breast Cancer Sister         mid 35s   [facility] Post-op Nausea/Vomiting Sister     Cancer Mother         thyroid    Hypertension Mother     Post-op Nausea/Vomiting Mother     Heart Disease Father         cabg 3 VESSEL     Current Outpatient Medications   Medication Sig    venlafaxine-SR (EFFEXOR-XR) 75 mg capsule TAKE 1 CAPSULE BY MOUTH EVERY DAY WITH FOOD    MELATONIN PO Take  by mouth.  tamoxifen (NOLVADEX) 20 mg tablet TAKE 1 TABLET BY MOUTH EVERY DAY    OTHER     magnesium 250 mg tab Take  by mouth.  ibuprofen (ADVIL) 200 mg tablet Take  by mouth every six (6) hours as needed for Pain.  DOXYCYCLINE HYCLATE PO Take 100 mg by mouth daily. ROSEACEA     No current facility-administered medications for this visit. Allergies   Allergen Reactions    Amoxicillin Nausea Only    Augmentin [Amoxicillin-Pot Clavulanate] Hives    Penicillins Hives     Pt reports \"I reacted as a child with hives and was told never to take it again\"  \"All- cillins\"        Review of Systems: A complete review of systems was obtained, negative except as described above.     Physical Exam:     Visit Vitals  /81 (BP 1 Location: Left arm, BP Patient Position: Sitting)   Pulse 89 Temp 98.7 °F (37.1 °C) (Temporal)   Resp 18   Wt 272 lb (123.4 kg)   SpO2 92%   BMI 36.89 kg/m²     ECOG PS: 0  General: No distress  Respiratory: Normal respiratory effort  CV: No peripheral edema  Skin: No rashes, ecchymoses, or petechiae  Psych: Alert, oriented, normal mood/affect      Results:     Lab Results   Component Value Date/Time    WBC 4.5 09/30/2019 09:42 AM    HGB 13.0 09/30/2019 09:42 AM    HCT 41.1 09/30/2019 09:42 AM    PLATELET 472 74/95/0501 09:42 AM    MCV 96.5 09/30/2019 09:42 AM    ABS. NEUTROPHILS 2.9 09/30/2019 09:42 AM     Lab Results   Component Value Date/Time    Sodium 142 09/30/2019 09:42 AM    Potassium 4.1 09/30/2019 09:42 AM    Chloride 111 (H) 09/30/2019 09:42 AM    CO2 26 09/30/2019 09:42 AM    Glucose 122 (H) 09/30/2019 09:42 AM    BUN 9 09/30/2019 09:42 AM    Creatinine 0.67 09/30/2019 09:42 AM    GFR est AA >60 09/30/2019 09:42 AM    GFR est non-AA >60 09/30/2019 09:42 AM    Calcium 9.2 09/30/2019 09:42 AM     Lab Results   Component Value Date/Time    Bilirubin, total 0.3 09/30/2019 09:42 AM    ALT (SGPT) 22 09/30/2019 09:42 AM    Alk. phosphatase 62 09/30/2019 09:42 AM    Protein, total 6.8 09/30/2019 09:42 AM    Albumin 3.6 09/30/2019 09:42 AM    Globulin 3.2 09/30/2019 09:42 AM         Records reviewed and summarized above. Pathology report(s) reviewed above. Assessment/plan:   1. Left UOQ IDC, 3 mm, gr 1, 0/4 LN, ER +, [place] +, HER 2 negative:  Stage IA (both anatomic and prognostic)    We explained to the patient that the goal of systemic adjuvant therapy is to improve the chances for cure and decrease the risk of relapse. We explained why a patient can have microscopic cancer spread now even though physical examination, laboratory studies and imaging studies are negative for cancer. We explained that the same treatments used now as adjuvant or preventive treatments rarely if ever are curative in women who develop metastases.      With her T1a disease, chemotherapy is not warranted. bilateral mastectomies, no indication for XRT. DEXA on 12/6/19 normal. Taking tamoxifen 20 mg daily, tolerating well. Has follow up with Patti Holt NP 10/2021.    2. Emotional well being:  She has excellent support and is coping well with her disease. On Effexor 75 mg daily. 3. Pathogenic mutation BRCA1:  Z.; bilateral mastectomies in 6/2019 and bilateral oophorectomies on 10/4/19. 4. Joint pain:  Better on jc than on anastrozole. Taking 250 mg magnesium PO daily with improvement. Now taking tamoxifen at night with complete resolution. 5. Obesity:  Has tried noom. Continues to go to the gym and riding exercise bike. Has seen her PCP and has follow up next week. She is considering Medi-fast.     This patient was seen in conjunction with Martin Decker NP. I personally reviewed the history and all points in the assessment and plan with the patient, and performed key points on the exam.       I appreciate the opportunity to participate in Ms. Alan Archibald's care. Signed By: Soila Tejada MD      No orders of the defined types were placed in this encounter.

## 2021-01-20 NOTE — PROGRESS NOTES
Thom Aguilar is a 48 y.o. female Follow up for the evaluation of breast cancer. 1. Have you been to the ER, urgent care clinic since your last visit? Hospitalized since your last visit? No    2. Have you seen or consulted any other health care providers outside of the [address] [place] [address] since your last visit? Include any pap smears or colon screening.  No

## 2021-06-22 ENCOUNTER — TELEPHONE (OUTPATIENT)
Dept: ONCOLOGY | Age: 51
End: 2021-06-22

## 2021-06-22 NOTE — TELEPHONE ENCOUNTER
LVM about Dr. Zander Morocho being out of the office the morning of July 14, 2021.  We have moved your appointment to July 20, 2021 at 3:45pm

## 2021-07-20 ENCOUNTER — VIRTUAL VISIT (OUTPATIENT)
Dept: ONCOLOGY | Age: 51
End: 2021-07-20
Payer: COMMERCIAL

## 2021-07-20 DIAGNOSIS — Z17.0 MALIGNANT NEOPLASM OF UPPER-OUTER QUADRANT OF LEFT BREAST IN FEMALE, ESTROGEN RECEPTOR POSITIVE (HCC): Primary | ICD-10-CM

## 2021-07-20 DIAGNOSIS — C50.412 MALIGNANT NEOPLASM OF UPPER-OUTER QUADRANT OF LEFT BREAST IN FEMALE, ESTROGEN RECEPTOR POSITIVE (HCC): Primary | ICD-10-CM

## 2021-07-20 PROCEDURE — 99213 OFFICE O/P EST LOW 20 MIN: CPT | Performed by: INTERNAL MEDICINE

## 2021-07-20 NOTE — PROGRESS NOTES
Cancer [place] at [facility]  [address] [facility]  Oriana Musca: [phone]  F: [phone]          Reason for Visit:   Cornel Burch is a 48 y.o. female who is seen by synchronous (real-time) audio-video technology for follow up of breast cancer      Breast surgeon:  Dr. Luis Armando Paredes  Consulting physician:  Dr. Robyn Oliver    Treatment History:   · 12/31/18 left breast bx:  IDC, gr 2, 3 mm, ER + at 100%, [place] + at 70%, HER 2 negative at [facility] 0; DCIS ER + at 100%, TN + at 70%; another site biopsied with ADH  · 2/20/19 L breast lumpectomy:  IDC, gr 1, 3 mm, DCIS present with EIC, papillary, cribriform; medial and posterior margins + for DCIS, 0/4 LN involved; pT1a pN0 cM0  · 6/17/19 bilateral mastectomy: right breast: benign, left breast: residual DCIS, gr 2 with focal necrosis, 12 mm, fibrocystic changes including columnar alteration, usual ductal hyperplasia  · BRCA 1 pathologic mutation  · Tamoxifen 7/2019-9/30/19  · 10/4/19 BSO, benign  · Anastrozole 10/2019-3/10/20 (stopped for joint pain)  · Tamoxifen 3/2020-    History of Present Illness: An abnormal mammogram led to the above pathology. FH:  Sister with breast cancer at age 40, BRCA 3 +; father BRCA 1 +; PGM with uterine cancer    Interval history: In today for follow up.   Complains of hot flashes    Last eye exam:  12/2020  Last gyn exam:  12/2020    Past Medical History:   Diagnosis Date    BRCA1-associated protein-1 tumor predisposition syndrome     Cancer ([address]     BREAST, LEFT    DCIS (ductal carcinoma in situ) of breast 2019    left    Heart murmur     at birth   Mohini Manual Ill-defined condition 03/2019    hairline fracture, left knee    Precancerous skin lesion 03/2019    mid back    Rosacea       Past Surgical History:   Procedure Laterality Date    HX BREAST LUMPECTOMY Left 2/20/2019    LEFT BREAST LUMPECTOMY WITH NEEDLE LOCALIZATION, LEFT BREAST SENTINEL NODE BIOPSY WITH BLUE DYE performed by Luis Armando Paredes, Malachy Boast, MD at [address] HX BREAST RECONSTRUCTION Bilateral 6/17/2019    IMMEDIATE BILATERAL BREAST RECONSTRUCTION WITH SILICONE GEL IMPLANTS AND ALLODERM GRAFTS performed by Kelley Gilmore MD at [address] HX [address] [facility]    dental procedure    HX MASTECTOMY Bilateral 6/17/2019    BILATERAL BREAST MASTECTOMIES performed by Osiris Lock MD at [address] HX [address] EXTRACTION  1990      Social History     Tobacco Use    Smoking status: Never Smoker    Smokeless tobacco: Never Used   Substance Use Topics    Alcohol use: Yes     Alcohol/week: 2.0 standard drinks     Types: 1 Glasses of wine, 1 Cans of beer per week     Comment: OCCAS. Family History   Problem Relation Age of Onset    Breast Cancer Sister         mid 35s   [employer] Post-op Nausea/Vomiting Sister     Cancer Mother         thyroid    Hypertension Mother     Post-op Nausea/Vomiting Mother     Heart Disease Father         cabg 3 VESSEL     Current Outpatient Medications   Medication Sig    venlafaxine-SR (EFFEXOR-XR) 75 mg capsule TAKE 1 CAPSULE BY MOUTH EVERY DAY WITH FOOD    MELATONIN PO Take  by mouth.  tamoxifen (NOLVADEX) 20 mg tablet TAKE 1 TABLET BY MOUTH EVERY DAY    OTHER     magnesium 250 mg tab Take  by mouth.  ibuprofen (ADVIL) 200 mg tablet Take  by mouth every six (6) hours as needed for Pain.  DOXYCYCLINE HYCLATE PO Take 100 mg by mouth daily. ROSEACEA     No current facility-administered medications for this visit. Allergies   Allergen Reactions    Amoxicillin Nausea Only    Augmentin [Amoxicillin-Pot Clavulanate] Hives    Penicillins Hives     Pt reports \"I reacted as a child with hives and was told never to take it again\"  \"All- cillins\"        Review of Systems: A complete review of systems was obtained, negative except as described above. Physical Exam:     There were no vitals taken for this visit.   ECOG PS: 0  General: alert, cooperative, no distress   Mental status: normal mood, behavior, speech, dress, motor activity, and thought processes, able to follow commands   HENT: NCAT   Neck: no visualized mass   Resp: no respiratory distress   Neuro: no gross deficits   Skin: no discoloration or lesions of concern on visible areas   Psychiatric: normal affect, consistent with stated mood, no evidence of hallucinations       Due to this being a TeleHealth evaluation (During WOJDW-28 public health emergency), many elements of the physical examination are unable to be assessed. Evaluation of the following organ systems was limited: Vitals/Constitutional/EENT/Resp/CV/GI//MS/Neuro/Skin/Heme-Lymph-Imm. Results:     Lab Results   Component Value Date/Time    WBC 4.5 09/30/2019 09:42 AM    HGB 13.0 09/30/2019 09:42 AM    HCT 41.1 09/30/2019 09:42 AM    PLATELET 859 56/06/5173 09:42 AM    MCV 96.5 09/30/2019 09:42 AM    ABS. NEUTROPHILS 2.9 09/30/2019 09:42 AM     Lab Results   Component Value Date/Time    Sodium 142 09/30/2019 09:42 AM    Potassium 4.1 09/30/2019 09:42 AM    Chloride 111 (H) 09/30/2019 09:42 AM    CO2 26 09/30/2019 09:42 AM    Glucose 122 (H) 09/30/2019 09:42 AM    BUN 9 09/30/2019 09:42 AM    Creatinine 0.67 09/30/2019 09:42 AM    GFR est AA >60 09/30/2019 09:42 AM    GFR est non-AA >60 09/30/2019 09:42 AM    Calcium 9.2 09/30/2019 09:42 AM     Lab Results   Component Value Date/Time    Bilirubin, total 0.3 09/30/2019 09:42 AM    ALT (SGPT) 22 09/30/2019 09:42 AM    Alk. phosphatase 62 09/30/2019 09:42 AM    Protein, total 6.8 09/30/2019 09:42 AM    Albumin 3.6 09/30/2019 09:42 AM    Globulin 3.2 09/30/2019 09:42 AM         Records reviewed and summarized above. Pathology report(s) reviewed above. Assessment/plan:   1.  Left UOQ IDC, 3 mm, gr 1, 0/4 LN, ER +, [place] +, HER 2 negative:  Stage IA (both anatomic and prognostic)    We explained to the patient that the goal of systemic adjuvant therapy is to improve the chances for cure and decrease the risk of relapse. We explained why a patient can have microscopic cancer spread now even though physical examination, laboratory studies and imaging studies are negative for cancer. We explained that the same treatments used now as adjuvant or preventive treatments rarely if ever are curative in women who develop metastases. With her T1a disease, chemotherapy is not warranted. bilateral mastectomies, no indication for XRT. DEXA on 12/6/19 normal. Taking tamoxifen 20 mg daily, tolerating well. Has follow up with August Bradley NP 10/2021.    2. Emotional well being:  She has excellent support and is coping well with her disease. On Effexor 75 mg daily. 3. Pathogenic mutation BRCA1:  K.; bilateral mastectomies in 6/2019 and bilateral oophorectomies on 10/4/19. 4. Hot flashes:  Due to jc, taking effexor with improvement    The patient was evaluated through a synchronous (real-time) audio-video encounter. The patient (or guardian if applicable) is aware that this is a billable service. Verbal consent to proceed has been obtained within the past 12 months. The visit was conducted pursuant to the emergency declaration under the [address] [place] [address], [address] [place] [address] authority and the [employer] and [employer] General Act. Patient identification was verified, and a caregiver was present when appropriate. The patient was located in a state where the provider was credentialed to provide care. S/p covid19 vaccination      I appreciate the opportunity to participate in Ms. Erik Archibald's care. Signed By: Vanessa Anne MD      No orders of the defined types were placed in this encounter.

## 2021-07-21 ENCOUNTER — TELEPHONE (OUTPATIENT)
Dept: ONCOLOGY | Age: 51
End: 2021-07-21

## 2021-07-21 NOTE — TELEPHONE ENCOUNTER
Lm with patient to return in about 7 months (around 2/20/2022). Gave her office number to call back.

## 2021-07-27 ENCOUNTER — TELEPHONE (OUTPATIENT)
Dept: ONCOLOGY | Age: 51
End: 2021-07-27

## 2021-07-27 NOTE — TELEPHONE ENCOUNTER
Narayan with patient return in about 7 months (around 2/20/2022). Gave her office number to call back.

## 2021-07-30 ENCOUNTER — TELEPHONE (OUTPATIENT)
Dept: ONCOLOGY | Age: 51
End: 2021-07-30

## 2021-09-30 RX ORDER — TAMOXIFEN CITRATE 20 MG/1
TABLET ORAL
Qty: 90 TABLET | Refills: 2 | Status: SHIPPED | OUTPATIENT
Start: 2021-09-30 | End: 2022-07-18

## 2021-10-12 ENCOUNTER — OFFICE VISIT (OUTPATIENT)
Dept: SURGERY | Age: 51
End: 2021-10-12
Payer: COMMERCIAL

## 2021-10-12 VITALS
WEIGHT: 272 LBS | BODY MASS INDEX: 36.84 KG/M2 | HEIGHT: 72 IN | SYSTOLIC BLOOD PRESSURE: 136 MMHG | DIASTOLIC BLOOD PRESSURE: 79 MMHG | HEART RATE: 99 BPM

## 2021-10-12 DIAGNOSIS — Z85.3 HX OF BREAST CANCER: Primary | ICD-10-CM

## 2021-10-12 PROCEDURE — 99212 OFFICE O/P EST SF 10 MIN: CPT | Performed by: SURGERY

## 2021-10-12 NOTE — LETTER
10/12/2021    Patient: Juanjo Pelayo   YOB: 1970   Date of Visit: 10/12/2021     Thong Aguilera NP  [address] [address]  Via Fax: [phone]    Dear Thong Aguilera NP,      Thank you for referring Ms. Claudy Farooq to [address] [facility] for evaluation. My notes for this consultation are attached. If you have questions, please do not hesitate to call me. I look forward to following your patient along with you.       Sincerely,    Glen Brantley MD

## 2021-10-12 NOTE — PATIENT INSTRUCTIONS
Eating Healthy Foods: Care Instructions  Your Care Instructions     Eating healthy foods can help lower your risk for disease. Healthy food gives you energy and keeps your heart strong, your brain active, your muscles working, and your bones strong. A healthy diet includes a variety of foods from the basic food groups: grains, vegetables, fruits, milk and milk products, and meat and beans. Some people may eat more of their favorite foods from only one food group and, as a result, miss getting the nutrients they need. So, it is important to pay attention not only to what you eat but also to what you are missing from your diet. You can eat a healthy, balanced diet by making a few small changes. Follow-up care is a key part of your treatment and safety. Be sure to make and go to all appointments, and call your doctor if you are having problems. It's also a good idea to know your test results and keep a list of the medicines you take. How can you care for yourself at home? Look at what you eat  · Keep a food diary for a week or two and record everything you eat or drink. Track the number of servings you eat from each food group. · For a balanced diet every day, eat a variety of:  ? 6 or more ounce-equivalents of grains, such as cereals, breads, crackers, rice, or pasta, every day. An ounce-equivalent is 1 slice of bread, 1 cup of ready-to-eat cereal, or ½ cup of cooked rice, cooked pasta, or cooked cereal.  ? 2½ cups of vegetables, especially:  § Dark-green vegetables such as broccoli and spinach. § Orange vegetables such as carrots and sweet potatoes. § Dry beans (such as chappell and kidney beans) and peas (such as lentils). ? 2 cups of fresh, frozen, or canned fruit. A small apple or 1 banana or orange equals 1 cup. ? 3 cups of nonfat or low-fat milk, yogurt, or other milk products. ? 5½ ounces of meat and beans, such as chicken, fish, lean meat, beans, nuts, and seeds.  One egg, 1 tablespoon of peanut butter, ½ ounce nuts or seeds, or ¼ cup of cooked beans equals 1 ounce of meat. · Learn how to read food labels for serving sizes and ingredients. Fast-food and convenience-food meals often contain few or no fruits or vegetables. Make sure you eat some fruits and vegetables to make the meal more nutritious. · Look at your food diary. For each food group, add up what you have eaten and then divide the total by the number of days. This will give you an idea of how much you are eating from each food group. See if you can find some ways to change your diet to make it more healthy. Start small  · Do not try to make dramatic changes to your diet all at once. You might feel that you are missing out on your favorite foods and then be more likely to fail. · Start slowly, and gradually change your habits. Try some of the following:  ? Use whole wheat bread instead of white bread. ? Use nonfat or low-fat milk instead of whole milk. ? Eat brown rice instead of white rice, and eat whole wheat pasta instead of white-flour pasta. ? Try low-fat cheeses and low-fat yogurt. ? Add more fruits and vegetables to meals and have them for snacks. ? Add lettuce, tomato, cucumber, and onion to sandwiches. ? Add fruit to yogurt and cereal.  Enjoy food  · You can still eat your favorite foods. You just may need to eat less of them. If your favorite foods are high in fat, salt, and sugar, limit how often you eat them, but do not cut them out entirely. · Eat a wide variety of foods. Make healthy choices when eating out  · The type of restaurant you choose can help you make healthy choices. Even fast-food chains are now offering more low-fat or healthier choices on the menu. · Choose smaller portions, or take half of your meal home. · When eating out, try:  ? A veggie pizza with a whole wheat crust or grilled chicken (instead of sausage or pepperoni).   ? Pasta with roasted vegetables, grilled chicken, or marinara sauce instead of cream sauce. ? A vegetable wrap or grilled chicken wrap. ? Broiled or poached food instead of fried or breaded items. Make healthy choices easy  · Buy packaged, prewashed, ready-to-eat fresh vegetables and fruits, such as baby carrots, salad mixes, and chopped or shredded broccoli and cauliflower. · Buy packaged, presliced fruits, such as melon or pineapple. · Choose 100% fruit or vegetable juice instead of soda. Limit juice intake to 4 to 6 oz (½ to ¾ cup) a day. · Blend low-fat yogurt, fruit juice, and canned or frozen fruit to make a smoothie for breakfast or a snack. Where can you learn more? Go to http://www.jean.com/  Enter T756 in the search box to learn more about \"Eating Healthy Foods: Care Instructions. \"  Current as of: December 17, 2020               Content Version: 13.0  © 2006-2021 Healthwise, Incorporated. Care instructions adapted under license by [employer] (which disclaims liability or warranty for this information). If you have questions about a medical condition or this instruction, always ask your healthcare professional. [place] [address] any warranty or liability for your use of this information.

## 2021-10-12 NOTE — PROGRESS NOTES
HISTORY OF PRESENT ILLNESS  Nirali Tillman is a 48 y.o. female. HPI ESTABLISHED patient here for annual follow up LEFT breast cancer, BILATERAL mastectomy w/ silicone implants. Patient reports doing well with no problems. Tamoxifen at night. Had flu-like symptoms with daytime use  She is seeing a dietician since 3/2021. Not losing weight    1/2019 LEFT invasive ductal carcinoma, grade 2, %. MA 70%, Her 2-  2/2019 LEFT lumpectomy. 3mm IDC grade 1.  0/4 nodes,  3 sites of DCIS. + medial and posterior margin  Tamoxifen  6/2019 Bilateral mastectomy with silicone implants  BRCA 1 positive    ROS    Physical Exam  Chest:      Breasts: Breasts are symmetrical.         Right: Absent. No mass, skin change or tenderness. Left: Absent. No mass, skin change or tenderness. Comments: Bilateral mastectomy with implants and reconstructed nipples  Lymphadenopathy:      Upper Body:      Right upper body: No supraclavicular or axillary adenopathy. Left upper body: No supraclavicular or axillary adenopathy. ASSESSMENT and PLAN    ICD-10-CM ICD-9-CM    1. Hx of breast cancer  Z85.3 V10.3      Total time spent with patient: 15 minutes   1. NAOMI  2. Tolerating tamoxifen. 3. Seeing a dietician since 3/2021, not losing weight but eating well and exercising  4.  Yearly visit x 5 years

## 2022-02-23 ENCOUNTER — VIRTUAL VISIT (OUTPATIENT)
Dept: ONCOLOGY | Age: 52
End: 2022-02-23
Payer: COMMERCIAL

## 2022-02-23 DIAGNOSIS — Z15.01 BRCA1 GENE MUTATION POSITIVE: ICD-10-CM

## 2022-02-23 DIAGNOSIS — Z17.0 MALIGNANT NEOPLASM OF UPPER-OUTER QUADRANT OF LEFT BREAST IN FEMALE, ESTROGEN RECEPTOR POSITIVE (HCC): Primary | ICD-10-CM

## 2022-02-23 DIAGNOSIS — Z15.09 BRCA1 GENE MUTATION POSITIVE: ICD-10-CM

## 2022-02-23 DIAGNOSIS — C50.412 MALIGNANT NEOPLASM OF UPPER-OUTER QUADRANT OF LEFT BREAST IN FEMALE, ESTROGEN RECEPTOR POSITIVE (HCC): Primary | ICD-10-CM

## 2022-02-23 PROCEDURE — 99214 OFFICE O/P EST MOD 30 MIN: CPT | Performed by: INTERNAL MEDICINE

## 2022-02-23 NOTE — PROGRESS NOTES
Cancer [place] at [place] [address] [facility]  Lachelle Like: [phone]  F: [phone]          Reason for Visit:   Promise Cerda is a 46 y.o. female who is seen by synchronous (real-time) audio-video technology for follow up of breast cancer      Breast surgeon:  Dr. Aurelio Garcia  Consulting physician:  Dr. Arti Bravo    Treatment History:   · 12/31/18 left breast bx:  IDC, gr 2, 3 mm, ER + at 100%, [place] + at 70%, HER 2 negative at [facility] 0; DCIS ER + at 100%, [place] + at 70%; another site biopsied with [facility]H  · 2/20/19 L breast lumpectomy:  IDC, gr 1, 3 mm, DCIS present with EIC, papillary, cribriform; medial and posterior margins + for DCIS, 0/4 LN involved; pT1a pN0 cM0  · 6/17/19 bilateral mastectomy: right breast: benign, left breast: residual DCIS, gr 2 with focal necrosis, 12 mm, fibrocystic changes including columnar alteration, usual ductal hyperplasia  · BRCA 1 pathologic mutation  · Tamoxifen 7/2019-9/30/19  · 10/4/19 BSO, benign  · Anastrozole 10/2019-3/10/20 (stopped for joint pain)  · Tamoxifen 3/2020-    History of Present Illness: An abnormal mammogram led to the above pathology. FH:  Sister with breast cancer at age 40, BRCA 3 +; father BRCA 1 +; PGM with uterine cancer    Interval history: In today for follow up.   Complains of hot flashes    Last eye exam:  12/2021  Last gyn exam:  12/2021    Past Medical History:   Diagnosis Date    BRCA1-associated protein-1 tumor predisposition syndrome     Cancer ([address]     BREAST, LEFT    DCIS (ductal carcinoma in situ) of breast 2019    left    Heart murmur     at birth   Crystal Gleason Ill-defined condition 03/2019    hairline fracture, left knee    Precancerous skin lesion 03/2019    mid back    Rosacea       Past Surgical History:   Procedure Laterality Date    HX BREAST LUMPECTOMY Left 2/20/2019    LEFT BREAST LUMPECTOMY WITH NEEDLE LOCALIZATION, LEFT BREAST SENTINEL NODE BIOPSY WITH BLUE DYE performed by Aurelio Garcia, Omari Olvera MD at [address] HX BREAST RECONSTRUCTION Bilateral 6/17/2019    IMMEDIATE BILATERAL BREAST RECONSTRUCTION WITH SILICONE GEL IMPLANTS AND ALLODERM GRAFTS performed by Cortney Enamorado MD at [address] HX [address] [facility]    dental procedure    HX MASTECTOMY Bilateral 6/17/2019    BILATERAL BREAST MASTECTOMIES performed by Katharina Borjas MD at [address]X [address] EXTRA[address]  [address]      Social History     Tobacco Use    Smoking status: Never Smoker    Smokeless tobacco: Never Used   Substance Use Topics    Alcohol use: Yes     Alcohol/week: 2.0 standard drinks     Types: 1 Glasses of wine, 1 Cans of beer per week     Comment: OCCAS. Family History   Problem Relation Age of Onset    Breast Cancer Sister         mid 35s   Vandana Martin Post-op Nausea/Vomiting Sister     Cancer Mother         thyroid    Hypertension Mother     Post-op Nausea/Vomiting Mother     Heart Disease Father         cabg 3 VESSEL     Current Outpatient Medications   Medication Sig    tamoxifen (NOLVADEX) 20 mg tablet TAKE 1 TABLET BY MOUTH EVERY DAY    venlafaxine-SR (EFFEXOR-XR) 75 mg capsule TAKE 1 CAPSULE BY MOUTH EVERY DAY WITH FOOD    MELATONIN PO Take  by mouth.  OTHER     magnesium 250 mg tab Take  by mouth.  ibuprofen (ADVIL) 200 mg tablet Take  by mouth every six (6) hours as needed for Pain.  DOXYCYCLINE HYCLATE PO Take 100 mg by mouth daily. ROSEACEA     No current facility-administered medications for this visit. Allergies   Allergen Reactions    Amoxicillin Nausea Only    Augmentin [Amoxicillin-Pot Clavulanate] Hives    Penicillins Hives     Pt reports \"I reacted as a child with hives and was told never to take it again\"  \"All- cillins\"        Review of Systems: A complete review of systems was obtained, negative except as described above. Physical Exam:     There were no vitals taken for this visit.   ECOG PS: 0  General: alert, cooperative, no distress   Mental status: normal mood, behavior, speech, dress, motor activity, and thought processes, able to follow commands   HENT: NCAT   Neck: no visualized mass   Resp: no respiratory distress   Neuro: no gross deficits   Skin: no discoloration or lesions of concern on visible areas   Psychiatric: normal affect, consistent with stated mood, no evidence of hallucinations       Due to this being a TeleHealth evaluation (During KGI-99 public health emergency), many elements of the physical examination are unable to be assessed. Evaluation of the following organ systems was limited: Vitals/Constitutional/EENT/Resp/CV/GI//MS/Neuro/Skin/Heme-Lymph-Imm. Results:     Lab Results   Component Value Date/Time    WBC 4.5 09/30/2019 09:42 AM    HGB 13.0 09/30/2019 09:42 AM    HCT 41.1 09/30/2019 09:42 AM    PLATELET 040 72/82/8682 09:42 AM    MCV 96.5 09/30/2019 09:42 AM    ABS. NEUTROPHILS 2.9 09/30/2019 09:42 AM     Lab Results   Component Value Date/Time    Sodium 142 09/30/2019 09:42 AM    Potassium 4.1 09/30/2019 09:42 AM    Chloride 111 (H) 09/30/2019 09:42 AM    CO2 26 09/30/2019 09:42 AM    Glucose 122 (H) 09/30/2019 09:42 AM    BUN 9 09/30/2019 09:42 AM    Creatinine 0.67 09/30/2019 09:42 AM    GFR est AA >60 09/30/2019 09:42 AM    GFR est non-AA >60 09/30/2019 09:42 AM    Calcium 9.2 09/30/2019 09:42 AM     Lab Results   Component Value Date/Time    Bilirubin, total 0.3 09/30/2019 09:42 AM    ALT (SGPT) 22 09/30/2019 09:42 AM    Alk. phosphatase 62 09/30/2019 09:42 AM    Protein, total 6.8 09/30/2019 09:42 AM    Albumin 3.6 09/30/2019 09:42 AM    Globulin 3.2 09/30/2019 09:42 AM         Records reviewed and summarized above. Pathology report(s) reviewed above. Assessment/plan:   1.  Left UOQ IDC, 3 mm, gr 1, 0/4 LN, ER +, [place] +, HER 2 negative:  Stage IA (both anatomic and prognostic)    We explained to the patient that the goal of systemic adjuvant therapy is to improve the chances for cure and decrease the risk of relapse. We explained why a patient can have microscopic cancer spread now even though physical examination, laboratory studies and imaging studies are negative for cancer. We explained that the same treatments used now as adjuvant or preventive treatments rarely if ever are curative in women who develop metastases. With her T1a disease, chemotherapy is not warranted. bilateral mastectomies, no indication for XRT. DEXA on 12/6/19 normal. Taking tamoxifen 20 mg daily, tolerating well. Has follow up with Trini Pablo NP 10/2021.    2. Emotional well being:  She has excellent support and is coping well with her disease. On Effexor 75 mg daily. 3. Pathogenic mutation BRCA1:  S.; bilateral mastectomies in 6/2019 and bilateral oophorectomies on 10/4/19. Recommend her seeing derm once a year, sees twice    She is seeing GI, she has a colonoscopy on 3/17/22 and is seeing Dr. Bharathi Tena    4. Hot flashes:  Due to jc, taking effexor with improvement    5. Joint pain:  Due to jc, improved as compared to AI    6. Weight gain:  She will count calories    The patient was evaluated through a synchronous (real-time) audio-video encounter. The patient (or guardian if applicable) is aware that this is a billable service, which includes applicable co-pays. This Virtual Visit was conducted with patient's (and/or legal guardian's) consent. The visit was conducted pursuant to the emergency declaration under the [address] [place] [address], [address] [place] [address] authority and the [employer] and [employer] General Act. Patient identification was verified, and a caregiver was present when appropriate. The patient was located in a state where the provider was licensed to provide care. S/p covid19 vaccination      I appreciate the opportunity to participate in Ms. Calli Archibald's care.     Signed By: Yuan Diaz MD      No orders of the defined types were placed in this encounter.

## 2022-02-24 ENCOUNTER — TELEPHONE (OUTPATIENT)
Dept: ONCOLOGY | Age: 52
End: 2022-02-24

## 2022-02-24 NOTE — TELEPHONE ENCOUNTER
Lm with patient to return in about 6 months (around 8/23/2022) for vv. Gave her office number to call back.

## 2022-02-28 ENCOUNTER — TELEPHONE (OUTPATIENT)
Dept: ONCOLOGY | Age: 52
End: 2022-02-28

## 2022-03-18 PROBLEM — C50.912 BREAST CANCER, LEFT (HCC): Status: ACTIVE | Noted: 2019-01-09

## 2022-03-19 PROBLEM — Z15.09 BRCA1 POSITIVE: Status: ACTIVE | Noted: 2019-08-15

## 2022-03-19 PROBLEM — Z15.01 BRCA1 POSITIVE: Status: ACTIVE | Noted: 2019-08-15

## 2022-07-18 RX ORDER — TAMOXIFEN CITRATE 20 MG/1
TABLET ORAL
Qty: 90 TABLET | Refills: 2 | Status: SHIPPED | OUTPATIENT
Start: 2022-07-18

## 2022-08-23 ENCOUNTER — VIRTUAL VISIT (OUTPATIENT)
Dept: ONCOLOGY | Age: 52
End: 2022-08-23
Payer: COMMERCIAL

## 2022-08-23 DIAGNOSIS — C50.412 MALIGNANT NEOPLASM OF UPPER-OUTER QUADRANT OF LEFT BREAST IN FEMALE, ESTROGEN RECEPTOR POSITIVE (HCC): Primary | ICD-10-CM

## 2022-08-23 DIAGNOSIS — Z17.0 MALIGNANT NEOPLASM OF UPPER-OUTER QUADRANT OF LEFT BREAST IN FEMALE, ESTROGEN RECEPTOR POSITIVE (HCC): Primary | ICD-10-CM

## 2022-08-23 PROCEDURE — 99214 OFFICE O/P EST MOD 30 MIN: CPT | Performed by: INTERNAL MEDICINE

## 2022-08-23 NOTE — PROGRESS NOTES
Cancer [place] at [place] [address] [facility], [address] [facility]  Christian Deutscher: [phone]  F: [phone]          Reason for Visit:   Thais Huang is a 46 y.o. female who is seen by synchronous (real-time) audio-video technology for follow up of breast cancer      Breast surgeon:  Dr. Reva Horan  Consulting physician:  Dr. Valeria Nichols    Treatment History:   12/31/18 left breast bx:  IDC, gr 2, 3 mm, ER + at 100%, MA + at 70%, HER 2 negative at [facility] 0; DCIS ER + at 100%, MA + at 70%; another site biopsied with ADH  2/20/19 L breast lumpectomy:  IDC, gr 1, 3 mm, DCIS present with EIC, papillary, cribriform; medial and posterior margins + for DCIS, 0/4 LN involved; pT1a pN0 cM0  6/17/19 bilateral mastectomy: right breast: benign, left breast: residual DCIS, gr 2 with focal necrosis, 12 mm, fibrocystic changes including columnar alteration, usual ductal hyperplasia  BRCA 1 pathologic mutation  Tamoxifen 7/2019-9/30/19  10/4/19 BSO, benign  Anastrozole 10/2019-3/10/20 (stopped for joint pain)  Tamoxifen 3/2020-    History of Present Illness: An abnormal mammogram led to the above pathology. FH:  Sister with breast cancer at age 40, BRCA 3 +; father BRCA 1 +; PGM with uterine cancer    Interval history: In today for follow up.       Last eye exam:  12/2021  Last gyn exam:  12/2021    Past Medical History:   Diagnosis Date    BRCA1-associated protein-1 tumor predisposition syndrome     Cancer ([facility] [address]     BREAST, LEFT    DCIS (ductal carcinoma in situ) of breast 2019    left    Heart murmur     at birth    Ill-defined condition 03/2019    hairline fracture, left knee    Precancerous skin lesion 03/2019    mid back    Rosacea       Past Surgical History:   Procedure Laterality Date    HX BREAST LUMPECTOMY Left 2/20/2019    LEFT BREAST LUMPECTOMY WITH NEEDLE LOCALIZATION, LEFT BREAST SENTINEL NODE BIOPSY WITH BLUE DYE performed by Mj Brothers MD at [facility] RECONSTRUCTION Bilateral 6/17/2019    IMMEDIATE BILATERAL BREAST RECONSTRUCTION WITH SILICONE GEL IMPLANTS AND ALLODERM GRAFTS performed by Larisa Reilly MD at [address] [facility]    dental procedure    HX MASTECTOMY Bilateral 6/17/2019    BILATERAL BREAST MASTECTOMIES performed by Handy Jaimes MD at [facility]    HX [address] [facility] [address] EXTRACTION  1990      Social History     Tobacco Use    Smoking status: Never    Smokeless tobacco: Never   Substance Use Topics    Alcohol use: Yes     Alcohol/week: 2.0 standard drinks     Types: 1 Glasses of wine, 1 Cans of beer per week     Comment: OCCAS. Family History   Problem Relation Age of Onset    Breast Cancer Sister         mid 35s    Post-op Nausea/Vomiting Sister     Cancer Mother         thyroid    Hypertension Mother     Post-op Nausea/Vomiting Mother     Heart Disease Father         cabg 3 VESSEL     Current Outpatient Medications   Medication Sig    tamoxifen (NOLVADEX) 20 mg tablet TAKE 1 TABLET BY MOUTH EVERY DAY    venlafaxine-SR (EFFEXOR-XR) 75 mg capsule TAKE 1 CAPSULE BY MOUTH EVERY DAY WITH FOOD    MELATONIN PO Take  by mouth. OTHER     magnesium 250 mg tab Take  by mouth. ibuprofen (ADVIL) 200 mg tablet Take  by mouth every six (6) hours as needed for Pain. DOXYCYCLINE HYCLATE PO Take 100 mg by mouth daily. ROSEACEA     No current facility-administered medications for this visit. Allergies   Allergen Reactions    Amoxicillin Nausea Only    Augmentin [Amoxicillin-Pot Clavulanate] Hives    Penicillins Hives     Pt reports \"I reacted as a child with hives and was told never to take it again\"  \"All- cillins\"        Review of Systems: A complete review of systems was obtained, negative except as described above. Physical Exam:     There were no vitals taken for this visit.   ECOG PS: 0  General: alert, cooperative, no distress   Mental  status: normal mood, behavior, speech, dress, motor activity, and thought processes, able to follow commands   HENT: NCAT   Neck: no visualized mass   Resp: no respiratory distress   Neuro: no gross deficits   Skin: no discoloration or lesions of concern on visible areas   Psychiatric: normal affect, consistent with stated mood, no evidence of hallucinations       Due to this being a TeleHealth evaluation (During KRXTS-49 public health emergency), many elements of the physical examination are unable to be assessed. Evaluation of the following organ systems was limited: Vitals/Constitutional/EENT/Resp/CV/GI//MS/Neuro/Skin/Heme-Lymph-Imm. Results:     Lab Results   Component Value Date/Time    WBC 4.5 09/30/2019 09:42 AM    HGB 13.0 09/30/2019 09:42 AM    HCT 41.1 09/30/2019 09:42 AM    PLATELET 447 04/70/7978 09:42 AM    MCV 96.5 09/30/2019 09:42 AM    ABS. NEUTROPHILS 2.9 09/30/2019 09:42 AM     Lab Results   Component Value Date/Time    Sodium 142 09/30/2019 09:42 AM    Potassium 4.1 09/30/2019 09:42 AM    Chloride 111 (H) 09/30/2019 09:42 AM    CO2 26 09/30/2019 09:42 AM    Glucose 122 (H) 09/30/2019 09:42 AM    BUN 9 09/30/2019 09:42 AM    Creatinine 0.67 09/30/2019 09:42 AM    GFR est AA >60 09/30/2019 09:42 AM    GFR est non-AA >60 09/30/2019 09:42 AM    Calcium 9.2 09/30/2019 09:42 AM     Lab Results   Component Value Date/Time    Bilirubin, total 0.3 09/30/2019 09:42 AM    ALT (SGPT) 22 09/30/2019 09:42 AM    Alk. phosphatase 62 09/30/2019 09:42 AM    Protein, total 6.8 09/30/2019 09:42 AM    Albumin 3.6 09/30/2019 09:42 AM    Globulin 3.2 09/30/2019 09:42 AM         Records reviewed and summarized above. Pathology report(s) reviewed above. Assessment/plan:   1. Left UOQ IDC, 3 mm, gr 1, 0/4 LN, ER +, NE +, HER 2 negative:  Stage IA (both anatomic and prognostic)    We explained to the patient that the goal of systemic adjuvant therapy is to improve the chances for cure and decrease the risk of relapse.  We explained why a patient can have microscopic cancer spread now even though physical examination, laboratory studies and imaging studies are negative for cancer. We explained that the same treatments used now as adjuvant or preventive treatments rarely if ever are curative in women who develop metastases. With her T1a disease, chemotherapy is not warranted. bilateral mastectomies, no indication for XRT. DEXA on 12/6/19 normal. Taking tamoxifen 20 mg daily, tolerating well. Has follow up with Romain Damon NP 10/2021.    2. Emotional well being:  She has excellent support and is coping well with her disease. On Effexor 75 mg daily. 3. Pathogenic mutation BRCA1:  U.; bilateral mastectomies in 6/2019 and bilateral oophorectomies on 10/4/19. Recommend her seeing derm once a year, sees twice    She is seeing GI, she had a colonoscopy on 3/17/22 and is seeing Dr. Faby Massey    4. Hot flashes:  Due to jc, taking effexor with improvement    5. Joint pain:  Due to jc, improved as compared to AI    The patient was evaluated through a synchronous (real-time) audio-video encounter. The patient (or guardian if applicable) is aware that this is a billable service, which includes applicable co-pays. This Virtual Visit was conducted with patient's (and/or legal guardian's) consent. The visit was conducted pursuant to the emergency declaration under the [address] [place] [address], [address] [place] [address] waiver authority and the [email or web address] and [employer] General Act. Patient identification was verified, and a caregiver was present when appropriate. The patient was located in a state where the provider was licensed to provide care. S/p covid19 vaccination      I appreciate the opportunity to participate in Ms. Vick Archibald's care. Signed By: Dolores Ochoa MD      No orders of the defined types were placed in this encounter.

## 2022-11-10 ENCOUNTER — OFFICE VISIT (OUTPATIENT)
Dept: SURGERY | Age: 52
End: 2022-11-10
Payer: COMMERCIAL

## 2022-11-10 VITALS — BODY MASS INDEX: 38.19 KG/M2 | WEIGHT: 282 LBS | HEIGHT: 72 IN

## 2022-11-10 DIAGNOSIS — Z85.3 HX OF BREAST CANCER: Primary | ICD-10-CM

## 2022-11-10 PROCEDURE — 99212 OFFICE O/P EST SF 10 MIN: CPT | Performed by: SURGERY

## 2022-11-10 NOTE — LETTER
11/10/2022    Patient: Brigid Carmona   YOB: 1970   Date of Visit: 11/10/2022     Yudith Black NP  [address] [facility] [address] [facility] [address]  Via Fax: [phone]    Dear Yudith Black NP,      Thank you for referring Ms. Cecilia Rivero to [address] for evaluation. My notes for this consultation are attached. If you have questions, please do not hesitate to call me. I look forward to following your patient along with you.       Sincerely,    Ladan Looney MD

## 2022-11-10 NOTE — PROGRESS NOTES
HISTORY OF PRESENT ILLNESS  Selma Fraire is a 46 y.o. female. HPI ESTABLISHED patient here for  annal follow visit for  Hx LEFT breast cancer. No problems. Tolerating tamoxifen, but cannot lose weight.      1/2019 LEFT invasive ductal carcinoma, grade 2, %. MA 70%, Her 2-  2/2019 LEFT lumpectomy. 3mm IDC grade 1.  0/4 nodes,  3 sites of DCIS. + medial and posterior margin  Tamoxifen  6/2019 Bilateral mastectomy with silicone implants  BRCA 1 positive    Review of Systems   All other systems reviewed and are negative. Physical Exam  Chest:   Breasts:     Breasts are symmetrical.      Right: No mass, skin change or tenderness. Left: No mass, skin change or tenderness. Comments: RIGHT implant is a bit hard  Lymphadenopathy:      Upper Body:      Right upper body: No supraclavicular or axillary adenopathy. Left upper body: No supraclavicular or axillary adenopathy. ASSESSMENT and PLAN    ICD-10-CM ICD-9-CM    1. Hx of breast cancer  Z85.3 V10.3       Total time spent on chart review and patient visit: 15 minutes    Hx of breast cancer  NAOMI  Completes 5yrs of tamoxifen next year  Hasn't been able to lose weight, feels like it is the tamoxifen.   She may see Dr Leilani Venegas

## 2023-03-20 ENCOUNTER — VIRTUAL VISIT (OUTPATIENT)
Dept: ONCOLOGY | Age: 53
End: 2023-03-20
Payer: COMMERCIAL

## 2023-03-20 DIAGNOSIS — Z17.0 MALIGNANT NEOPLASM OF UPPER-OUTER QUADRANT OF LEFT BREAST IN FEMALE, ESTROGEN RECEPTOR POSITIVE (HCC): Primary | ICD-10-CM

## 2023-03-20 DIAGNOSIS — Z15.01 BRCA1 GENE MUTATION POSITIVE: ICD-10-CM

## 2023-03-20 DIAGNOSIS — C50.412 MALIGNANT NEOPLASM OF UPPER-OUTER QUADRANT OF LEFT BREAST IN FEMALE, ESTROGEN RECEPTOR POSITIVE (HCC): Primary | ICD-10-CM

## 2023-03-20 DIAGNOSIS — Z15.09 BRCA1 GENE MUTATION POSITIVE: ICD-10-CM

## 2023-03-20 PROCEDURE — 99214 OFFICE O/P EST MOD 30 MIN: CPT | Performed by: INTERNAL MEDICINE

## 2023-03-20 NOTE — PROGRESS NOTES
Cancer [place] at [place] [address] [address] [facility] [address] [facility] Gun: [phone]  F: [phone]          Reason for Visit:   Donal Escobar is a 46 y.o. female who is seen by synchronous (real-time) audio-video technology for follow up of breast cancer      Breast surgeon:  Dr. Rafael Peacock  Consulting physician:  Dr. David Damian    Treatment History:   12/31/18 left breast bx:  IDC, gr 2, 3 mm, ER + at 100%, [place] + at 70%, HER 2 negative [facility] 0; DCIS ER + at 100%, MD + at 70%; another site biopsied with ADH  2/20/19 L breast lumpectomy:  IDC, gr 1, 3 mm, DCIS present with EIC, papillary, cribriform; medial and posterior margins + for DCIS, 0/4 LN involved; pT1a pN0 cM0  6/17/19 bilateral mastectomy: right breast: benign, left breast: residual DCIS, gr 2 with focal necrosis, 12 mm, fibrocystic changes including columnar alteration, usual ductal hyperplasia  BRCA 1 pathologic mutation  Tamoxifen 7/2019-9/30/19  10/4/19 BSO, benign  Anastrozole 10/2019-3/10/20 (stopped for joint pain)  Tamoxifen 3/2020-    History of Present Illness: An abnormal mammogram led to the above pathology. FH:  Sister with breast cancer at age 40, BRCA 3 +; father BRCA 1 +; PGM with uterine cancer    Interval history: In today for follow up.   Having worsening joint pain    Last eye exam:  12/2022  Last gyn exam:  2/2023    Past Medical History:   Diagnosis Date    BRCA1-associated protein-1 tumor predisposition syndrome     Cancer ([address]     BREAST, LEFT    DCIS (ductal carcinoma in situ) of breast 2019    left    Heart murmur     at birth    Ill-defined condition 03/2019    hairline fracture, left knee    Precancerous skin lesion 03/2019    mid back    Rosacea       Past Surgical History:   Procedure Laterality Date    HX BREAST LUMPECTOMY Left 2/20/2019    LEFT BREAST LUMPECTOMY WITH NEEDLE LOCALIZATION, LEFT BREAST SENTINEL NODE BIOPSY WITH BLUE DYE performed by Shukri Reagan MD at [facility] AM[facility]UL[facility] OR    HX BREAST RECONSTRUCTION Bilateral 6/17/2019    IMMEDIATE BILATERAL BREAST RECONSTRUCTION WITH SILICONE GEL IMPLANTS AND ALLODERM GRAFTS performed by Patricia Del Castillo MD at [address] [facility]    dental procedure    HX MASTECTOMY Bilateral 6/17/2019    BILATERAL BREAST MASTECTOMIES performed by Rosendo Abdi MD at [facility]    HX [address] [address] EXTRACTION  1990      Social History     Tobacco Use    Smoking status: Never    Smokeless tobacco: Never   Substance Use Topics    Alcohol use: Yes     Alcohol/week: 2.0 standard drinks     Types: 1 Glasses of wine, 1 Cans of beer per week     Comment: OCCAS. Family History   Problem Relation Age of Onset    Breast Cancer Sister         mid 35s    Post-op Nausea/Vomiting Sister     Cancer Mother         thyroid    Hypertension Mother     Post-op Nausea/Vomiting Mother     Heart Disease Father         cabg 3 VESSEL     Current Outpatient Medications   Medication Sig    tamoxifen (NOLVADEX) 20 mg tablet TAKE 1 TABLET BY MOUTH EVERY DAY    venlafaxine-SR (EFFEXOR-XR) 75 mg capsule TAKE 1 CAPSULE BY MOUTH EVERY DAY WITH FOOD    MELATONIN PO Take  by mouth. OTHER     magnesium 250 mg tab Take  by mouth. ibuprofen (ADVIL) 200 mg tablet Take  by mouth every six (6) hours as needed for Pain. DOXYCYCLINE HYCLATE PO Take 100 mg by mouth daily. ROSEACEA     No current facility-administered medications for this visit. Allergies   Allergen Reactions    Amoxicillin Nausea Only    Augmentin [Amoxicillin-Pot Clavulanate] Hives    Penicillins Hives     Pt reports \"I reacted as a child with hives and was told never to take it again\"  \"All- cillins\"        Review of Systems: A complete review of systems was obtained, negative except as described above. Physical Exam:     There were no vitals taken for this visit.   ECOG PS: 0  General: alert, cooperative, no distress   Mental  status: normal mood, behavior, speech, dress, motor activity, and thought processes, able to follow commands   HENT: NCAT   Neck: no visualized mass   Resp: no respiratory distress   Neuro: no gross deficits   Skin: no discoloration or lesions of concern on visible areas   Psychiatric: normal affect, consistent with stated mood, no evidence of hallucinations       Due to this being a TeleHealth evaluation (During PRLPO-49 public health emergency), many elements of the physical examination are unable to be assessed. Evaluation of the following organ systems was limited: Vitals/Constitutional/EENT/Resp/CV/GI//MS/Neuro/Skin/Heme-Lymph-Imm. Results:     Lab Results   Component Value Date/Time    WBC 4.5 09/30/2019 09:42 AM    HGB 13.0 09/30/2019 09:42 AM    HCT 41.1 09/30/2019 09:42 AM    PLATELET 642 05/37/1755 09:42 AM    MCV 96.5 09/30/2019 09:42 AM    ABS. NEUTROPHILS 2.9 09/30/2019 09:42 AM     Lab Results   Component Value Date/Time    Sodium 142 09/30/2019 09:42 AM    Potassium 4.1 09/30/2019 09:42 AM    Chloride 111 (H) 09/30/2019 09:42 AM    CO2 26 09/30/2019 09:42 AM    Glucose 122 (H) 09/30/2019 09:42 AM    BUN 9 09/30/2019 09:42 AM    Creatinine 0.67 09/30/2019 09:42 AM    GFR est AA >60 09/30/2019 09:42 AM    GFR est non-AA >60 09/30/2019 09:42 AM    Calcium 9.2 09/30/2019 09:42 AM     Lab Results   Component Value Date/Time    Bilirubin, total 0.3 09/30/2019 09:42 AM    ALT (SGPT) 22 09/30/2019 09:42 AM    Alk. phosphatase 62 09/30/2019 09:42 AM    Protein, total 6.8 09/30/2019 09:42 AM    Albumin 3.6 09/30/2019 09:42 AM    Globulin 3.2 09/30/2019 09:42 AM         Records reviewed and summarized above. Pathology report(s) reviewed above. Assessment/plan:   1. Left UOQ IDC, 3 mm, gr 1, 0/4 LN, ER +, [place] +, HER 2 negative:  Stage IA (both anatomic and prognostic)    We explained to the patient that the goal of systemic adjuvant therapy is to improve the chances for cure and decrease the risk of relapse.  We explained why a patient can have microscopic cancer spread now even though physical examination, laboratory studies and imaging studies are negative for cancer. We explained that the same treatments used now as adjuvant or preventive treatments rarely if ever are curative in women who develop metastases. With her T1a disease, chemotherapy is not warranted. bilateral mastectomies, no indication for XRT. DEXA on 12/6/19 normal. Taking tamoxifen 20 mg daily, having worsening joint pains over the past month. Mostly on right foot. 2. Emotional well being:  She has excellent support and is coping well with her disease. On Effexor 75 mg daily. 3. Pathogenic mutation BRCA1:  A.; bilateral mastectomies in 6/2019 and bilateral oophorectomies on 10/4/19. Recommend her seeing derm once a year, sees twice    She is seeing GI, she had a colonoscopy on 3/17/22 and is seeing Dr. Taqueria Dinh -- colonoscopy 3/2023    4. Hot flashes:  Due to jc, taking effexor with improvement    5. Joint pain:  Due to jc, will stop for 2-3 weeks and re-eval.  If no improvement, will get bone scan and possible back MRI    The patient was evaluated through a synchronous (real-time) audio-video encounter. The patient (or guardian if applicable) is aware that this is a billable service, which includes applicable co-pays. This Virtual Visit was conducted with patient's (and/or legal guardian's) consent. The visit was conducted pursuant to the emergency declaration under the [facility], [address] [place] [address] authority and the [employer] and [employer] General Act. Patient identification was verified, and a caregiver was present when appropriate. The patient was located in a state where the provider was licensed to provide care. S/p covid19 vaccination      I appreciate the opportunity to participate in Ms. Marichuy Archibald's care.     Signed By: Surekha Craig MD      No orders of the defined types were placed in this encounter.

## 2023-07-06 ENCOUNTER — TELEPHONE (OUTPATIENT)
Age: 53
End: 2023-07-06

## 2023-08-08 ENCOUNTER — TELEMEDICINE (OUTPATIENT)
Age: 53
End: 2023-08-08
Payer: COMMERCIAL

## 2023-08-08 DIAGNOSIS — Z17.0 MALIGNANT NEOPLASM OF UPPER-OUTER QUADRANT OF LEFT BREAST IN FEMALE, ESTROGEN RECEPTOR POSITIVE (HCC): Primary | ICD-10-CM

## 2023-08-08 DIAGNOSIS — C50.412 MALIGNANT NEOPLASM OF UPPER-OUTER QUADRANT OF LEFT BREAST IN FEMALE, ESTROGEN RECEPTOR POSITIVE (HCC): Primary | ICD-10-CM

## 2023-08-08 PROCEDURE — 99214 OFFICE O/P EST MOD 30 MIN: CPT | Performed by: INTERNAL MEDICINE

## 2023-08-08 RX ORDER — EXEMESTANE 25 MG/1
25 TABLET ORAL DAILY
COMMUNITY
Start: 2023-07-24

## 2023-08-08 RX ORDER — ROSUVASTATIN CALCIUM 5 MG/1
TABLET, COATED ORAL
COMMUNITY
Start: 2023-05-18

## 2023-08-08 NOTE — PROGRESS NOTES
Heidi Stern is a 46 y.o. female presents today for a virtual visit to follow up for breast cancer    1. Have you been to the ER, urgent care clinic since your last visit? Hospitalized since your last visit?no    2. Have you seen or consulted any other health care providers outside of the [address] [place] [address] Avenue since your last visit? Include any pap smears or colon screening.  no'

## 2023-08-08 NOTE — PROGRESS NOTES
Cancer [place] at [address], [address] [place] [address]   Dina Bolds: [phone]  F: [phone]                   Reason for Visit:     Nikko Esposito is a 46 y.o.  female who is seen by synchronous (real-time) audio-video technology for follow up of breast cancer        Breast surgeon:  Dr. Rodrigo Conner   Consulting physician:  Dr. Kartik Rodríguez          Treatment History:      12/31/18 left breast bx:  IDC, gr 2, 3 mm, ER + at 100%, [place] + at 70%, HER 2 negative at [facility] 0; DCIS ER + at 100%, [place] + at 70%; another  site biopsied with ADH    2/20/19 L breast lumpectomy:  IDC, gr 1, 3 mm, DCIS present with EIC, papillary, cribriform; medial and posterior margins + for DCIS, 0/4 LN involved; pT1a pN0 cM0    6/17/19 bilateral mastectomy: right breast: benign, left breast: residual DCIS, gr 2 with focal necrosis, 12 mm, fibrocystic changes including columnar alteration, usual ductal hyperplasia    BRCA 1 pathologic mutation    Tamoxifen 7/2019-9/30/19    10/4/19 BSO, benign    Anastrozole 10/2019-3/10/20 (stopped for joint pain)    Tamoxifen 3/2020-3/20/23 (joint pains)    Exemestane 4/11/23-8/8/23 (break until November)          History of Present Illness: An abnormal mammogram led to the above pathology. FH:  Sister with breast cancer at age 40, BRCA 3 +; father BRCA 1 +; PGM with uterine cancer      Interval history: In today for follow up. Joint pain has improved off of tamoxifen          Review of systems was obtained and pertinent findings reviewed above. Past medical history, social history, family history, medications, and allergies are located in the electronic medical record. Physical Exam:        There were no vitals filed for this visit. ECOG PS: 0        General:   alert, cooperative, no distress       Mental  status:    HENT:       Psychiatric:                    Results:         Records reviewed and summarized above.    Pathology report(s) reviewed

## 2023-08-09 ENCOUNTER — TELEPHONE (OUTPATIENT)
Age: 53
End: 2023-08-09

## 2023-08-09 NOTE — TELEPHONE ENCOUNTER
Called patient to schedule f/u appointment. No answer. Left a voicemail      Return in about 3 months (around 11/8/2023) for vv.

## 2023-08-15 ENCOUNTER — TELEPHONE (OUTPATIENT)
Age: 53
End: 2023-08-15

## 2023-08-16 ENCOUNTER — TELEPHONE (OUTPATIENT)
Age: 53
End: 2023-08-16

## 2024-02-08 NOTE — PROGRESS NOTES
Pathology results in and concurred by Dr Lazaro Bosworth and NP Colletta Sparrow office contacted for surgical consult preference.
Pathology results relayed to patient by Dr Leroy Mims. Appointment arranged for Breast Talk with Dr Mitzi Portillo as requested by Lydia Whitney NP office. Patient aware of date time and location of appointment. Patient encouraged to bring updated insurance information that changed for 2019.
Patient at stereotactic biopsy at two sites left breast.  Pressure held to both biopsy sites for 5 minutes. No bleeding oozing or hematoma noted at either site. Dressings remained dry and intact post breast clip mammogram.  Post biopsy discharge instructions reviewed with patient and patient given copy. Ice pack applied over transparent dressing. Patient prefers to be contacted after 2:30 pm with pathology results when they are in.
Patient prefers to be contacted by phone with pathology result.
Patient received for left breast biopsy for calcifications seen on mammogram.  Procedure explained to patient as well as risks associated with procedure. Post biopsy discharge instructions reviewed with patient. Patient's additional questions answered by Dr Florida Alanis.
Referring NP Rosendo Doing request [facility] for referral.
Several attempts made to speak with Ms Narayan Turner by phone unsuccessful. Message left for patient to call back.
no

## 2024-04-23 RX ORDER — EXEMESTANE 25 MG/1
25 TABLET ORAL DAILY
Qty: 90 TABLET | Refills: 3 | OUTPATIENT
Start: 2024-04-23

## (undated) DEVICE — PAD,ABDOMINAL,5"X9",ST,LF,25/BX: Brand: MEDLINE INDUSTRIES, INC.

## (undated) DEVICE — Z DISCONTINUED PER MEDLINE PACK PROCEDURE SURG PLAS

## (undated) DEVICE — SUT SLK 2-0SH 30IN BLK --

## (undated) DEVICE — SHEARS ENDOSCP L36CM DIA5MM ULTRASONIC CRV TIP W/ ADV

## (undated) DEVICE — DRAPE,REIN 53X77,STERILE: Brand: MEDLINE

## (undated) DEVICE — LIGHT HANDLE: Brand: DEVON

## (undated) DEVICE — SUTURE ABSORBABLE BRAIDED 2-0 CT-1 27 IN UD VICRYL J259H

## (undated) DEVICE — SOLUTION IV 1000ML 0.9% SOD CHL

## (undated) DEVICE — STRAP,POSITIONING,KNEE/BODY,FOAM,4X60": Brand: MEDLINE

## (undated) DEVICE — SUTURE MCRYL SZ 4-0 L27IN ABSRB UD L19MM PS-2 1/2 CIR PRIM Y426H

## (undated) DEVICE — NDL FLTR TIP 5 MIC 18GX1.5IN --

## (undated) DEVICE — SUT ETHLN 2-0 18IN FS BLK --

## (undated) DEVICE — NEEDLE HYPO 25GA L1.5IN BVL ORIENTED ECLIPSE

## (undated) DEVICE — CANISTER, RIGID, 3000CC: Brand: MEDLINE INDUSTRIES, INC.

## (undated) DEVICE — SOL IRRIGATION INJ NACL 0.9% 500ML BTL

## (undated) DEVICE — DERMABOND SKIN ADH 0.7ML -- DERMABOND ADVANCED 12/BX

## (undated) DEVICE — CHEST PACK: Brand: MEDLINE INDUSTRIES, INC.

## (undated) DEVICE — STERILE NEOPRENE POWDER-FREE SURGICAL GLOVES WITH NITRILE COATING: Brand: PROTEXIS

## (undated) DEVICE — GARMENT,MEDLINE,DVT,INT,CALF,MED, GEN2: Brand: MEDLINE

## (undated) DEVICE — 3M™ BAIR HUGGER® UNDERBODY BLANKET, FULL ACCESS, 10 PER CASE 63500: Brand: BAIR HUGGER™

## (undated) DEVICE — COVER LT HNDL PLAS RIG 1 PER PK

## (undated) DEVICE — GLOVE SURG SZ 75 L1212IN FNGR THK138MIL BRN LTX FREE

## (undated) DEVICE — STERILE POLYISOPRENE POWDER-FREE SURGICAL GLOVES: Brand: PROTEXIS

## (undated) DEVICE — LAPAROSCOPIC TROCAR SLEEVE/SINGLE USE: Brand: KII® OPTICAL ACCESS SYSTEM

## (undated) DEVICE — SYR 10ML LUER LOK 1/5ML GRAD --

## (undated) DEVICE — ROCKER SWITCH PENCIL BLADE ELECTRODE, HOLSTER: Brand: EDGE

## (undated) DEVICE — SPONGE LAP 18X18IN STRL -- 5/PK

## (undated) DEVICE — PAD,SANITARY,11 IN,MAXI,N-STRL,IND WRAP: Brand: MEDLINE

## (undated) DEVICE — SUTURE VCRL SZ 3-0 L27IN ABSRB UD L26MM SH 1/2 CIR J416H

## (undated) DEVICE — TOTAL TRAY, DB, 100% SILI FOLEY, 16FR 10: Brand: MEDLINE

## (undated) DEVICE — TISSUE RETRIEVAL SYSTEM: Brand: INZII RETRIEVAL SYSTEM

## (undated) DEVICE — (D)PREP SKN CHLRAPRP APPL 26ML -- CONVERT TO ITEM 371833

## (undated) DEVICE — SURGICAL PROCEDURE PACK GYN LAPAROSCOPY CUST SMH LF

## (undated) DEVICE — DRAPE,CHEST,FENES,15X10,STERIL: Brand: MEDLINE

## (undated) DEVICE — TROCAR: Brand: KII® SLEEVE

## (undated) DEVICE — SUTURE SZ 0 27IN 5/8 CIR UR-6  TAPER PT VIOLET ABSRB VICRYL J603H

## (undated) DEVICE — REM POLYHESIVE ADULT PATIENT RETURN ELECTRODE: Brand: VALLEYLAB

## (undated) DEVICE — DEVON™ KNEE AND BODY STRAP 60" X 3" (1.5 M X 7.6 CM): Brand: DEVON

## (undated) DEVICE — TRAY PREP DRY W/ PREM GLV 2 APPL 6 SPNG 2 UNDPD 1 OVERWRAP

## (undated) DEVICE — 2DE14 2-0 UNDYD MONODERM 30X30: Brand: 2DE14 2-0 UNDYD MONODERM 30X30

## (undated) DEVICE — SOLUTION IRRIGATION NACL 0.9% 1000 ML FLX CONTAINER

## (undated) DEVICE — Device

## (undated) DEVICE — SYR 50ML LR LCK 1ML GRAD NSAF --

## (undated) DEVICE — 3000CC GUARDIAN II: Brand: GUARDIAN

## (undated) DEVICE — INFECTION CONTROL KIT SYS

## (undated) DEVICE — TROCAR: Brand: KII FIOS FIRST ENTRY

## (undated) DEVICE — STAPLER SKIN H3.9MM WIRE DIA0.58MM CRWN 6.9MM 35 STPL ROT

## (undated) DEVICE — STERILE POLYISOPRENE POWDER-FREE SURGICAL GLOVES WITH EMOLLIENT COATING: Brand: PROTEXIS

## (undated) DEVICE — SOL INJ SOD CL0.9% 10ML PREFIL --

## (undated) DEVICE — APPLICATOR BNDG 1MM ADH PREMIERPRO EXOFIN

## (undated) DEVICE — SUTURE MCRYL SZ 3-0 L27IN ABSRB UD L19MM PS-2 3/8 CIR PRIM Y427H

## (undated) DEVICE — SUTURE VCRL SZ 3-0 L18IN ABSRB UD L26MM SH 1/2 CIR J864D

## (undated) DEVICE — KENDALL SCD EXPRESS SLEEVES, KNEE LENGTH, MEDIUM: Brand: KENDALL SCD